# Patient Record
Sex: FEMALE | Race: BLACK OR AFRICAN AMERICAN | Employment: FULL TIME | URBAN - METROPOLITAN AREA
[De-identification: names, ages, dates, MRNs, and addresses within clinical notes are randomized per-mention and may not be internally consistent; named-entity substitution may affect disease eponyms.]

---

## 2017-01-24 ENCOUNTER — OFFICE VISIT (OUTPATIENT)
Dept: FAMILY MEDICINE CLINIC | Age: 22
End: 2017-01-24

## 2017-01-24 VITALS
OXYGEN SATURATION: 92 % | BODY MASS INDEX: 26.36 KG/M2 | HEART RATE: 68 BPM | RESPIRATION RATE: 16 BRPM | HEIGHT: 63 IN | TEMPERATURE: 98.2 F | DIASTOLIC BLOOD PRESSURE: 60 MMHG | WEIGHT: 148.8 LBS | SYSTOLIC BLOOD PRESSURE: 112 MMHG

## 2017-01-24 DIAGNOSIS — G89.29 CHRONIC PAIN OF RIGHT KNEE: ICD-10-CM

## 2017-01-24 DIAGNOSIS — N97.9 INFERTILITY, FEMALE: ICD-10-CM

## 2017-01-24 DIAGNOSIS — M25.561 CHRONIC PAIN OF RIGHT KNEE: ICD-10-CM

## 2017-01-24 DIAGNOSIS — Z00.00 PHYSICAL EXAM: Primary | ICD-10-CM

## 2017-01-24 PROBLEM — E28.2 PCOS (POLYCYSTIC OVARIAN SYNDROME): Status: ACTIVE | Noted: 2017-01-24

## 2017-01-24 PROBLEM — J45.30 MILD PERSISTENT ASTHMA WITHOUT COMPLICATION: Status: ACTIVE | Noted: 2017-01-24

## 2017-01-24 RX ORDER — NAPROXEN 500 MG/1
500 TABLET ORAL 2 TIMES DAILY WITH MEALS
Qty: 60 TAB | Refills: 1 | Status: SHIPPED | OUTPATIENT
Start: 2017-01-24 | End: 2017-08-09 | Stop reason: ALTCHOICE

## 2017-01-24 RX ORDER — METFORMIN HYDROCHLORIDE 1000 MG/1
1000 TABLET ORAL 2 TIMES DAILY WITH MEALS
COMMUNITY
End: 2017-02-20 | Stop reason: SDUPTHER

## 2017-01-24 RX ORDER — ALBUTEROL SULFATE 90 UG/1
AEROSOL, METERED RESPIRATORY (INHALATION)
COMMUNITY
End: 2017-08-09

## 2017-01-24 RX ORDER — IPRATROPIUM BROMIDE AND ALBUTEROL SULFATE 2.5; .5 MG/3ML; MG/3ML
3 SOLUTION RESPIRATORY (INHALATION)
COMMUNITY
End: 2017-08-09

## 2017-01-24 RX ORDER — BUDESONIDE AND FORMOTEROL FUMARATE DIHYDRATE 160; 4.5 UG/1; UG/1
AEROSOL RESPIRATORY (INHALATION)
Refills: 2 | COMMUNITY
Start: 2016-10-20 | End: 2019-06-28 | Stop reason: SDUPTHER

## 2017-01-24 NOTE — PATIENT INSTRUCTIONS

## 2017-01-24 NOTE — PROGRESS NOTES
Joe Mena is a 19 y.o. female   Chief Complaint   Patient presents with   [facility]    pt here for CPE. Pt with asthma and sees a pulmonologist for this. Pt reports she has been on steroids frequently in past and concerned of side effects, was previously not well controlled and feels much better controlled on symbicort. Pt is actively trying to become pregnant has been trying on and off for 2 years. Pt has also tried 3 rounds of clomid. Has pap scheduled for today. Pt states she has knee sx plica resection 4794 and has occasional pain, uses naprosyn once in a while and would like refill of med. Chief Complaint   Patient presents with   [email or web address]     she is a 19y.o. year old female who presents for evalution. Reviewed PmHx, RxHx, FmHx, SocHx, AllgHx and updated and dated in the chart. Review of Systems - negative except as listed above in the HPI    Objective:     Vitals:    01/24/17 0824   BP: 112/60   Pulse: 68   Resp: 16   Temp: 98.2 °F (36.8 °C)   TempSrc: Oral   SpO2: 92%   Weight: 148 lb 12.8 oz (67.5 kg)   Height: 5' 3.39\" (1.61 m)       Current Outpatient Prescriptions   Medication Sig    SYMBICORT 160-4.5 mcg/actuation HFA inhaler USE 2 PUFF INHALATION BY MOUTH TWICE DAILY FOR 30 DAY(S)    albuterol (VENTOLIN HFA) 90 mcg/actuation inhaler Take  by inhalation.  albuterol-ipratropium (DUO-NEB) 2.5 mg-0.5 mg/3 ml nebu 3 mL by Nebulization route.  metFORMIN (GLUCOPHAGE) 1,000 mg tablet Take 1,000 mg by mouth two (2) times daily (with meals).  naproxen (NAPROSYN) 500 mg tablet Take 1 Tab by mouth two (2) times daily (with meals). As needed     No current facility-administered medications for this visit.         Physical Examination: General appearance - alert, well appearing, and in no distress  Eyes - pupils equal and reactive, extraocular eye movements intact  Ears - bilateral TM's and external ear canals normal  Nose - normal and patent, no erythema, discharge or polyps  Mouth - mucous membranes moist, pharynx normal without lesions  Neck - supple, no significant adenopathy  Chest - clear to auscultation, no wheezes, rales or rhonchi, symmetric air entry  Heart - normal rate, regular rhythm, normal S1, S2, no murmurs, rubs, clicks or gallops  Abdomen - soft, nontender, nondistended, no masses or organomegaly  Back exam - full range of motion, no tenderness, palpable spasm or pain on motion  Neurological - alert, oriented, normal speech, no focal findings or movement disorder noted  Musculoskeletal - no joint tenderness, deformity or swelling  Extremities - peripheral pulses normal, no pedal edema, no clubbing or cyanosis  Skin - normal coloration and turgor, no rashes, no suspicious skin lesions noted      Assessment/ Plan:   Barbara Herrera was seen today for establish care. Diagnoses and all orders for this visit:    Physical exam  -     LIPID PANEL  -     METABOLIC PANEL, COMPREHENSIVE  -     CBC WITH AUTOMATED DIFF  -     TSH 3RD GENERATION    Chronic pain of right knee  -     naproxen (NAPROSYN) 500 mg tablet; Take 1 Tab by mouth two (2) times daily (with meals). As needed    Infertility, female  -     REFERRAL TO ENDOCRINOLOGY     [facility]  Follow-up Disposition:  Return in about 1 year (around 1/24/2018), or if symptoms worsen or fail to improve. I have discussed the diagnosis with the patient and the intended plan as seen in the above orders. The patient has received an after-visit summary and questions were answered concerning future plans. Pt conveyed understanding of plan.     Medication Side Effects and Warnings were discussed with patient      Clearence Plana, DO

## 2017-01-24 NOTE — MR AVS SNAPSHOT
Visit Information Date & Time Provider Department Dept. Phone Encounter #  
 1/24/2017  8:00 AM Lindsay GamezGuillermo [phone] [id number] Follow-up Instructions Return if symptoms worsen or fail to improve. Upcoming Health Maintenance Date Due  
 HPV AGE 9Y-34Y (1 of 3 - Female 3 Dose Series) 6/12/2004 DTaP/Tdap/Td series (1 - Tdap) 6/12/2014 PAP AKA CERVICAL CYTOLOGY 6/12/2014 INFLUENZA AGE 9 TO ADULT 8/1/2016 Allergies as of 1/24/2017  Review Complete On: 1/24/2017 By: Lindsay Gamez DO Severity Noted Reaction Type Reactions Latex  01/24/2017   Side Effect Hives Pineapple  01/24/2017   Side Effect Hives Current Immunizations  Never Reviewed Name Date Influenza Vaccine 10/15/2016 Pneumococcal Conjugate (PCV-13) 7/15/2015 Not reviewed this visit You Were Diagnosed With   
  
 Codes Comments Physical exam    -  Primary ICD-10-CM: Z00.00 ICD-9-CM: V70.9 Chronic pain of right knee     ICD-10-CM: M25.561, G89.29 ICD-9-CM: 719.46, 338.29 Vitals BP Pulse Temp Resp Height(growth percentile) Weight(growth percentile) 112/60 (BP 1 Location: Left arm, BP Patient Position: Sitting) 68 98.2 °F (36.8 °C) (Oral) 16 5' 3.39\" (1.61 m) 148 lb 12.8 oz (67.5 kg) LMP SpO2 BMI OB Status Smoking Status (LMP Unknown) 92% 26.04 kg/m2 Polycystic Ovarian Syndrome Never Smoker Vitals History BMI and BSA Data Body Mass Index Body Surface Area 26.04 kg/m 2 1.74 m 2 Preferred Pharmacy Pharmacy Name Phone [id number] [address] [phone] Your Updated Medication List  
  
   
This list is accurate as of: 1/24/17  8:51 AM.  Always use your most recent med list.  
  
  
  
  
 albuterol-ipratropium 2.5 mg-0.5 mg/3 ml Nebu Commonly known as:  DUO-NEB  
3 mL by Nebulization route. metFORMIN 1,000 mg tablet Commonly known as:  GLUCOPHAGE Take 1,000 mg by mouth two (2) times daily (with meals). naproxen 500 mg tablet Commonly known as:  NAPROSYN Take 1 Tab by mouth two (2) times daily (with meals). As needed SYMBICORT 160-4.5 mcg/actuation HFA inhaler Generic drug:  budesonide-formoterol USE 2 PUFF INHALATION BY MOUTH TWICE DAILY FOR 30 DAY(S) VENTOLIN HFA 90 mcg/actuation inhaler Generic drug:  albuterol Take  by inhalation. Prescriptions Sent to Pharmacy Refills  
 naproxen (NAPROSYN) 500 mg tablet 1 Sig: Take 1 Tab by mouth two (2) times daily (with meals). As needed Class: Normal  
 Pharmacy: [id number], [address] [place] [address] #: [phone] Route: Oral  
  
We Performed the Following CBC WITH AUTOMATED DIFF [59550 CPT(R)] LIPID PANEL [74684 CPT(R)] METABOLIC PANEL, COMPREHENSIVE [37501 CPT(R)] TSH 3RD GENERATION [05894 CPT(R)] Follow-up Instructions Return if symptoms worsen or fail to improve. Patient Instructions A Healthy Lifestyle: Care Instructions Your Care Instructions A healthy lifestyle can help you feel good, stay at a healthy weight, and have plenty of energy for both work and play. A healthy lifestyle is something you can share with your whole family. A healthy lifestyle also can lower your risk for serious health problems, such as high blood pressure, heart disease, and diabetes. You can follow a few steps listed below to improve your health and the health of your family. Follow-up care is a key part of your treatment and safety. Be sure to make and go to all appointments, and call your doctor if you are having problems. Its also a good idea to know your test results and keep a list of the medicines you take. How can you care for yourself at home? · Do not eat too much sugar, fat, or fast foods.  You can still have dessert and treats now and then. The goal is moderation. · Start small to improve your eating habits. Pay attention to portion sizes, drink less juice and soda pop, and eat more fruits and vegetables. ¨ Eat a healthy amount of food. A 3-ounce serving of meat, for example, is about the size of a deck of cards. Fill the rest of your plate with vegetables and whole grains. ¨ Limit the amount of soda and sports drinks you have every day. Drink more water when you are thirsty. ¨ Eat at least 5 servings of fruits and vegetables every day. It may seem like a lot, but it is not hard to reach this goal. A serving or helping is 1 piece of fruit, 1 cup of vegetables, or 2 cups of leafy, raw vegetables. Have an apple or some carrot sticks as an afternoon snack instead of a candy bar. Try to have fruits and/or vegetables at every meal. 
· Make exercise part of your daily routine. You may want to start with simple activities, such as walking, bicycling, or slow swimming. Try to be active 30 to 60 minutes every day. You do not need to do all 30 to 60 minutes all at once. For example, you can exercise 3 times a day for 10 or 20 minutes. Moderate exercise is safe for most people, but it is always a good idea to talk to your doctor before starting an exercise program. 
· Keep moving. Josafat Sang the lawn, work in the garden, or [employer]. Take the stairs instead of the elevator at work. · If you smoke, quit. People who smoke have an increased risk for heart attack, stroke, cancer, and other lung illnesses. Quitting is hard, but there are ways to boost your chance of quitting tobacco for good. ¨ Use nicotine gum, patches, or lozenges. ¨ Ask your doctor about stop-smoking programs and medicines. ¨ Keep trying.  
In addition to reducing your risk of diseases in the future, you will notice some benefits soon after you stop using tobacco. If you have shortness of breath or asthma symptoms, they will likely get better within a few weeks after you quit. · Limit how much alcohol you drink. Moderate amounts of alcohol (up to 2 drinks a day for men, 1 drink a day for women) are okay. But drinking too much can lead to liver problems, high blood pressure, and other health problems. Family health If you have a family, there are many things you can do together to improve your health. · Eat meals together as a family as often as possible. · Eat healthy foods. This includes fruits, vegetables, lean meats and dairy, and whole grains. · Include your family in your fitness plan. Most people think of activities such as jogging or tennis as the way to fitness, but there are many ways you and your family can be more active. Anything that makes you breathe hard and gets your heart pumping is exercise. Here are some tips: 
¨ Walk to do errands or to take your child to school or the bus. ¨ Go for a family bike ride after dinner instead of watching TV. Where can you learn more? Go to http://tracey-quang.info/. Enter A873 in the search box to learn more about \"A Healthy Lifestyle: Care Instructions. \" Current as of: July 26, 2016 Content Version: 11.1 © 2047-1993 [employer]. Care instructions adapted under license by [employer] (which disclaims liability or warranty for this information). If you have questions about a medical condition or this instruction, always ask your healthcare professional. [place] [address] any warranty or liability for your use of this information. Introducing hospitals & HEALTH SERVICES! Mirta Angela introduces [employer] patient portal. Now you can access parts of your medical record, email your doctor's office, and request medication refills online. 1. In your internet browser, go to [email or web address] 2. Click on the First Time User? Click Here link in the Sign In box. You will see the New Member Sign Up page. 3. Enter your Refresh Body Access Code exactly as it appears below. You will not need to use this code after youve completed the sign-up process. If you do not sign up before the expiration date, you must request a new code. · Refresh Body Access Code: [email or web address] Expires: 4/24/2017  8:13 AM 
 
4. Enter the last four digits of your Social Security Number (xxxx) and Date of Birth (mm/dd/yyyy) as indicated and click Submit. You will be taken to the next sign-up page. 5. Create a Refresh Body ID. This will be your Refresh Body login ID and cannot be changed, so think of one that is secure and easy to remember. 6. Create a Refresh Body password. You can change your password at any time. 7. Enter your Password Reset Question and Answer. This can be used at a later time if you forget your password. 8. Enter your e-mail address. You will receive e-mail notification when new information is available in [address]. 9. Click Sign Up. You can now view and download portions of your medical record. 10. Click the Download Summary menu link to download a portable copy of your medical information. If you have questions, please visit the Frequently Asked Questions section of the Refresh Body website. Remember, Refresh Body is NOT to be used for urgent needs. For medical emergencies, dial 911. Now available from your iPhone and Android! Please provide this summary of care documentation to your next provider. Your primary care clinician is listed as Socrates Sesay. If you have any questions after today's visit, please call [phone].

## 2017-01-24 NOTE — PROGRESS NOTES
Pt here to est care  Pt new to area, unsure what pharm would like to use, would like any rx's on paper

## 2017-01-25 LAB
ALBUMIN SERPL-MCNC: 4.5 G/DL (ref 3.5–5.5)
ALBUMIN/GLOB SERPL: 1.5 {RATIO} (ref 1.1–2.5)
ALP SERPL-CCNC: 75 IU/L (ref 39–117)
ALT SERPL-CCNC: 9 IU/L (ref 0–32)
AST SERPL-CCNC: 13 IU/L (ref 0–40)
BASOPHILS # BLD AUTO: 0 X10E3/UL (ref 0–0.2)
BASOPHILS NFR BLD AUTO: 0 %
BILIRUB SERPL-MCNC: 0.3 MG/DL (ref 0–1.2)
BUN SERPL-MCNC: 9 MG/DL (ref 6–20)
BUN/CREAT SERPL: 10 (ref 8–20)
CALCIUM SERPL-MCNC: 9.7 MG/DL (ref 8.7–10.2)
CHLORIDE SERPL-SCNC: 100 MMOL/L (ref 96–106)
CHOLEST SERPL-MCNC: 186 MG/DL (ref 100–199)
CO2 SERPL-SCNC: 27 MMOL/L (ref 18–29)
CREAT SERPL-MCNC: 0.86 MG/DL (ref 0.57–1)
EOSINOPHIL # BLD AUTO: 0 X10E3/UL (ref 0–0.4)
EOSINOPHIL NFR BLD AUTO: 0 %
ERYTHROCYTE [DISTWIDTH] IN BLOOD BY AUTOMATED COUNT: 12.7 % (ref 12.3–15.4)
GLOBULIN SER CALC-MCNC: 3 G/DL (ref 1.5–4.5)
GLUCOSE SERPL-MCNC: 82 MG/DL (ref 65–99)
HCT VFR BLD AUTO: 35.5 % (ref 34–46.6)
HDLC SERPL-MCNC: 42 MG/DL
HGB BLD-MCNC: 11.6 G/DL (ref 11.1–15.9)
IMM GRANULOCYTES # BLD: 0 X10E3/UL (ref 0–0.1)
IMM GRANULOCYTES NFR BLD: 0 %
INTERPRETATION, 910389: NORMAL
LDLC SERPL CALC-MCNC: 123 MG/DL (ref 0–99)
LYMPHOCYTES # BLD AUTO: 3 X10E3/UL (ref 0.7–3.1)
LYMPHOCYTES NFR BLD AUTO: 24 %
MCH RBC QN AUTO: 27.9 PG (ref 26.6–33)
MCHC RBC AUTO-ENTMCNC: 32.7 G/DL (ref 31.5–35.7)
MCV RBC AUTO: 85 FL (ref 79–97)
MONOCYTES # BLD AUTO: 1 X10E3/UL (ref 0.1–0.9)
MONOCYTES NFR BLD AUTO: 8 %
NEUTROPHILS # BLD AUTO: 8.3 X10E3/UL (ref 1.4–7)
NEUTROPHILS NFR BLD AUTO: 68 %
PLATELET # BLD AUTO: 437 X10E3/UL (ref 150–379)
POTASSIUM SERPL-SCNC: 4.6 MMOL/L (ref 3.5–5.2)
PROT SERPL-MCNC: 7.5 G/DL (ref 6–8.5)
RBC # BLD AUTO: 4.16 X10E6/UL (ref 3.77–5.28)
SODIUM SERPL-SCNC: 142 MMOL/L (ref 134–144)
TRIGL SERPL-MCNC: 104 MG/DL (ref 0–149)
TSH SERPL DL<=0.005 MIU/L-ACNC: 1.05 UIU/ML (ref 0.45–4.5)
VLDLC SERPL CALC-MCNC: 21 MG/DL (ref 5–40)
WBC # BLD AUTO: 12.3 X10E3/UL (ref 3.4–10.8)

## 2017-02-20 DIAGNOSIS — E28.2 PCOS (POLYCYSTIC OVARIAN SYNDROME): Primary | ICD-10-CM

## 2017-02-20 RX ORDER — METFORMIN HYDROCHLORIDE 500 MG/1
500 TABLET ORAL 2 TIMES DAILY WITH MEALS
Qty: 60 TAB | Refills: 11 | Status: SHIPPED | OUTPATIENT
Start: 2017-02-20 | End: 2017-08-09 | Stop reason: ALTCHOICE

## 2017-03-21 ENCOUNTER — OFFICE VISIT (OUTPATIENT)
Dept: FAMILY MEDICINE CLINIC | Age: 22
End: 2017-03-21

## 2017-03-21 VITALS
OXYGEN SATURATION: 99 % | DIASTOLIC BLOOD PRESSURE: 82 MMHG | HEIGHT: 63 IN | WEIGHT: 168.8 LBS | RESPIRATION RATE: 18 BRPM | SYSTOLIC BLOOD PRESSURE: 112 MMHG | HEART RATE: 76 BPM | BODY MASS INDEX: 29.91 KG/M2 | TEMPERATURE: 97.8 F

## 2017-03-21 DIAGNOSIS — D72.829 LEUKOCYTOSIS, UNSPECIFIED TYPE: Primary | ICD-10-CM

## 2017-03-21 NOTE — PATIENT INSTRUCTIONS
Complete Blood Count (CBC): About This Test  What is it? A complete blood count (CBC) is a blood test that gives important information about your blood cells, especially red blood cells, white blood cells, and platelets. Why is this test done? A CBC may be done as part of a regular physical exam. There are many other reasons that a doctor may want this blood test, including to:  · Find the cause of symptoms such as fatigue, weakness, fever, bruising, or weight loss. · Find anemia or an infection. · See how much blood has been lost if there is bleeding. · Diagnose diseases of the blood, such as leukemia or polycythemia. How can you prepare for the test?  You do not need to do anything before having this test.  What happens during the test?  The health professional taking a sample of your blood will:  · Wrap an elastic band around your upper arm. This makes the veins below the band larger so it is easier to put a needle into the vein. · Clean the needle site with alcohol. · Put the needle into the vein. · Attach a tube to the needle to fill it with blood. · Remove the band from your arm when enough blood is collected. · Put a gauze pad or cotton ball over the needle site as the needle is removed. · Put pressure on the site and then put on a bandage. If this blood test is done on a baby, a heel stick may be done instead of a blood draw from a vein. What happens after the test?  · You will probably be able to go home right away. · You can go back to your usual activities right away. Follow-up care is a key part of your treatment and safety. Be sure to make and go to all appointments, and call your doctor if you are having problems. It's also a good idea to keep a list of the medicines you take. Ask your doctor when you can expect to have your test results. Where can you learn more? Go to http://tracey-quang.info/.   Enter U239 in the search box to learn more about \"Complete Blood Count (CBC): About This Test.\"  Current as of: February 19, 2016  Content Version: 11.1  © 2174-5949 [employer]. Care instructions adapted under license by [employer] (which disclaims liability or warranty for this information). If you have questions about a medical condition or this instruction, always ask your healthcare professional. [place] [address] any warranty or liability for your use of this information.

## 2017-03-21 NOTE — PROGRESS NOTES
Shannon Herr is a 19 y.o. female   Chief Complaint   Patient presents with    Labs    pt here for recheck of her WBC and states that her mother has a blood dyscrasia but is not sure what but will ask her mother and send me the name of her issue. Pt was not feeling sick when labs were last drawn but states she is frequently stressed out. she is a 19y.o. year old female who presents for evalution. Reviewed PmHx, RxHx, FmHx, SocHx, AllgHx and updated and dated in the chart. Review of Systems - negative except as listed above in the HPI    Objective:     Vitals:    03/21/17 0837   BP: 112/82   Pulse: 76   Resp: 18   Temp: 97.8 °F (36.6 °C)   TempSrc: Oral   SpO2: 99%   Weight: 168 lb 12.8 oz (76.6 kg)   Height: 5' 3.39\" (1.61 m)       Current Outpatient Prescriptions   Medication Sig    metFORMIN (GLUCOPHAGE) 500 mg tablet Take 1 Tab by mouth two (2) times daily (with meals).  SYMBICORT 160-4.5 mcg/actuation HFA inhaler USE 2 PUFF INHALATION BY MOUTH TWICE DAILY FOR 30 DAY(S)    albuterol (VENTOLIN HFA) 90 mcg/actuation inhaler Take  by inhalation.  albuterol-ipratropium (DUO-NEB) 2.5 mg-0.5 mg/3 ml nebu 3 mL by Nebulization route.  naproxen (NAPROSYN) 500 mg tablet Take 1 Tab by mouth two (2) times daily (with meals). As needed     No current facility-administered medications for this visit. Physical Examination: General appearance - alert, well appearing, and in no distress  Eyes - pupils equal and reactive, extraocular eye movements intact  Ears - bilateral TM's and external ear canals normal  Mouth - mucous membranes moist, pharynx normal without lesions  Neck - supple, no significant adenopathy  Chest - clear to auscultation, no wheezes, rales or rhonchi, symmetric air entry  Heart - normal rate, regular rhythm, normal S1, S2, no murmurs, rubs, clicks or gallops      Assessment/ Plan:   Olamide Mendez was seen today for labs.     Diagnoses and all orders for this visit:    Leukocytosis, unspecified type  -     CBC WITH AUTOMATED DIFF       Follow-up Disposition:  Return if symptoms worsen or fail to improve. I have discussed the diagnosis with the patient and the intended plan as seen in the above orders. The patient has received an after-visit summary and questions were answered concerning future plans. Pt conveyed understanding of plan.     Medication Side Effects and Warnings were discussed with patient      Yareli Mcgarry, DO

## 2017-03-22 LAB
BASOPHILS # BLD AUTO: 0 X10E3/UL (ref 0–0.2)
BASOPHILS NFR BLD AUTO: 0 %
EOSINOPHIL # BLD AUTO: 0.1 X10E3/UL (ref 0–0.4)
EOSINOPHIL NFR BLD AUTO: 1 %
ERYTHROCYTE [DISTWIDTH] IN BLOOD BY AUTOMATED COUNT: 13.1 % (ref 12.3–15.4)
HCT VFR BLD AUTO: 36.3 % (ref 34–46.6)
HGB BLD-MCNC: 11.7 G/DL (ref 11.1–15.9)
IMM GRANULOCYTES # BLD: 0 X10E3/UL (ref 0–0.1)
IMM GRANULOCYTES NFR BLD: 0 %
LYMPHOCYTES # BLD AUTO: 3.3 X10E3/UL (ref 0.7–3.1)
LYMPHOCYTES NFR BLD AUTO: 27 %
MCH RBC QN AUTO: 28.2 PG (ref 26.6–33)
MCHC RBC AUTO-ENTMCNC: 32.2 G/DL (ref 31.5–35.7)
MCV RBC AUTO: 88 FL (ref 79–97)
MONOCYTES # BLD AUTO: 0.8 X10E3/UL (ref 0.1–0.9)
MONOCYTES NFR BLD AUTO: 7 %
NEUTROPHILS # BLD AUTO: 8.1 X10E3/UL (ref 1.4–7)
NEUTROPHILS NFR BLD AUTO: 65 %
PLATELET # BLD AUTO: 405 X10E3/UL (ref 150–379)
RBC # BLD AUTO: 4.15 X10E6/UL (ref 3.77–5.28)
WBC # BLD AUTO: 12.3 X10E3/UL (ref 3.4–10.8)

## 2017-07-19 ENCOUNTER — OFFICE VISIT (OUTPATIENT)
Dept: FAMILY MEDICINE CLINIC | Age: 22
End: 2017-07-19

## 2017-07-19 VITALS
SYSTOLIC BLOOD PRESSURE: 132 MMHG | OXYGEN SATURATION: 100 % | DIASTOLIC BLOOD PRESSURE: 78 MMHG | BODY MASS INDEX: 30.83 KG/M2 | WEIGHT: 174 LBS | RESPIRATION RATE: 18 BRPM | TEMPERATURE: 98.2 F | HEIGHT: 63 IN | HEART RATE: 88 BPM

## 2017-07-19 DIAGNOSIS — D72.829 LEUKOCYTOSIS, UNSPECIFIED TYPE: Primary | ICD-10-CM

## 2017-07-19 DIAGNOSIS — Z23 ENCOUNTER FOR IMMUNIZATION: ICD-10-CM

## 2017-07-19 DIAGNOSIS — Z11.1 SCREENING-PULMONARY TB: ICD-10-CM

## 2017-07-19 DIAGNOSIS — Z01.84 IMMUNITY STATUS TESTING: ICD-10-CM

## 2017-07-19 NOTE — PATIENT INSTRUCTIONS
Td (Tetanus, Diphtheria) Vaccine: What You Need to Know  Why get vaccinated? Tetanus and diphtheria are very serious diseases. They are rare in the [place] today, but people who do become infected often have severe complications. Td vaccine is used to protect adolescents and adults from both of these diseases. Both diphtheria and tetanus are infections caused by bacteria. Diphtheria spreads from person to person through secretions from coughing or sneezing. Tetanus-causing bacteria enter the body through cuts, scratches, or wounds. TETANUS (lockjaw) causes painful muscle tightening and stiffness, usually all over the body. · It can lead to tightening of muscles in the head and neck so you can't open your mouth, swallow, or sometimes even breathe. Tetanus kills about 1 out of every 10 people who are infected even after receiving the best medical care. DIPHTHERIA can cause a thick coating to form in the back of the throat. · It can lead to breathing problems, heart failure, paralysis, and death. Before vaccines, as many as 200,000 cases of diphtheria and hundreds of cases of tetanus were reported in the [place] each year. Since vaccination began, reports of cases for both diseases have dropped by about 99%. Td vaccine  Td vaccine can protect adolescents and adults from tetanus and diphtheria. Td is usually given as a booster dose every 10 years, but it can also be given earlier after a severe and dirty wound or burn. Another vaccine, called Tdap, which protects against pertussis in addition to tetanus and diphtheria, is sometimes recommended instead of Td vaccine. Your doctor or the person giving you the vaccine can give you more information. Td may safely be given at the same time as other vaccines.   Some people should not get this vaccine  · A person who has ever had a life-threatening allergic reaction after a previous dose of any tetanus- or diphtheria-containing vaccine, OR has a severe allergy to any part of this vaccine, should not get Td vaccine. Tell the person giving the vaccine about any severe allergies. · Talk to your doctor if you:  ¨ Have seizures or another nervous system problem. ¨ Had severe pain or swelling after any vaccine containing diphtheria or tetanus. ¨ Ever had a condition called Guillain Barré Syndrome (GBS). ¨ Aren't feeling well on the day the shot is scheduled. Risks of a vaccine reaction  With any medicine, including vaccines, there is a chance of side effects. These are usually mild and go away on their own. Serious reactions are also possible but are rare. Most people who get Td vaccine do not have any problems with it. Mild problems, following Td vaccine  (Did not interfere with activities)  · Pain where the shot was given (about 8 people in 10)  · Redness or swelling where the shot was given (about 1 person in 4)  · Mild fever (rare)  · Headache (about 1 person in 4)  · Tiredness (about 1 person in 4)  Moderate problems, following Td vaccine  (Interfered with activities, but did not require medical attention)  · Fever over 102°F (rare)  Severe problems, following Td vaccine  (Unable to perform usual activities; required medical attention)  · Swelling, severe pain, bleeding, and/or redness in the arm where the shot was given (rare)  Problems that could happen after any vaccine:  · People sometimes faint after a medical procedure, including vaccination. Sitting or lying down for about 15 minutes can help prevent fainting, and injuries caused by a fall. Tell your doctor if you feel dizzy or have vision changes or ringing in the ears. · Some people get severe pain in the shoulder and have difficulty moving the arm where a shot was given. This happens very rarely. · Any medication can cause a severe allergic reaction.  Such reactions from a vaccine are very rare, estimated at fewer than 1 in a million doses, and would happen within a few minutes to a few hours after the vaccination. As with any medicine, there is a very remote chance of a vaccine causing a serious injury or death. The safety of vaccines is always being monitored. For more information, visit: www.cdc.gov/vaccinesafety. What if there is a serious reaction? What should I look for? · Look for anything that concerns you, such as signs of a severe allergic reaction, very high fever, or unusual behavior. Signs of a severe allergic reaction can include hives, swelling of the face and throat, difficulty breathing, a fast heartbeat, dizziness, and weakness. These would usually start a few minutes to a few hours after the vaccination. What should I do? · If you think it is a severe allergic reaction or other emergency that can't wait, call 9-1-1 or get the person to the nearest hospital. Otherwise, call your doctor. · Afterward, the reaction should be reported to the Vaccine Adverse Event Reporting System (VAERS). Your doctor might file this report, or you can do it yourself through the VAERS web site at www.vaers. [facility].gov, or by calling 0-315.967.9428. VAERS does not give medical advice. The National Vaccine Injury Compensation Program  The National Vaccine Injury Compensation Program (VICP) is a federal program that was created to compensate people who may have been injured by certain vaccines. Persons who believe they may have been injured by a vaccine can learn about the program and about filing a claim by calling 5-848.120.3913 or visiting the [employer] website at www.[facility].gov/vaccinecompensation. There is a time limit to file a claim for compensation. How can I learn more? · Ask your doctor. He or she can give you the vaccine package insert or suggest other sources of information. · Call your local or state health department.   · Contact the Centers for Disease Control and Prevention (CDC):  ¨ Call 3-333.150.9536 (1-800-CDC-INFO) or  ¨ Visit CDC's website at www.cdc.gov/vaccines  Vaccine Information Statement (Interim)  Td Vaccine  (2/24/2015)  42 JOSE FRANCISCO Faye 258SW-06  Department of Health and Human Services  Centers for Disease Control and Prevention  Many Vaccine Information Statements are available in Citizen of [place] and other languages. See www.immunize.org/vis. Muchas hojas de información sobre vacunas están disponibles en español y en otros idiomas. Visite www.immunize.org/vis. Care instructions adapted under license by [employer] (which disclaims liability or warranty for this information). If you have questions about a medical condition or this instruction, always ask your healthcare professional. [address] any warranty or liability for your use of this information.

## 2017-07-19 NOTE — PROGRESS NOTES
Pineda Kennedy is a 23 y.o. 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 Needs two step but pt request blood test and this is acceptable. Pt will need a tdap and will check titers. Pt also with elevated WBC count last check and will recheck today. she is a 23y.o. year old female who presents for evalution. Reviewed PmHx, RxHx, FmHx, SocHx, AllgHx and updated and dated in the chart. Review of Systems - negative except as listed above in the HPI    Objective:     Vitals:    07/19/17 1557   BP: 132/78   Pulse: 88   Resp: 18   Temp: 98.2 °F (36.8 °C)   TempSrc: Oral   SpO2: 100%   Weight: 174 lb (78.9 kg)   Height: 5' 3.39\" (1.61 m)       Current Outpatient Prescriptions   Medication Sig    norel[facility]romin[facility]ethinyl est[facility]) 150-35 mcg/24 hr 1 Patch by TransDERmal route Every Saturday.  metFORMIN (GLUCOPHAGE) 500 mg tablet Take 1 Tab by mouth two (2) times daily (with meals).  SYMBICORT 160-4.5 mcg/actuation HFA inhaler USE 2 PUFF INHALATION BY MOUTH TWICE DAILY FOR 30 DAY(S)    albuterol (VENTOLIN HFA) 90 mcg/actuation inhaler Take  by inhalation.  albuterol-ipratropium (DUO-NEB) 2.5 mg-0.5 mg/3 ml nebu 3 mL by Nebulization route.  naproxen (NAPROSYN) 500 mg tablet Take 1 Tab by mouth two (2) times daily (with meals). As needed     No current facility-administered medications for this visit.         Physical Examination: General appearance - alert, well appearing, and in no distress  Mental status - alert, oriented to person, place, and time  Eyes - pupils equal and reactive, extraocular eye movements intact  Ears - bilateral TM's and external ear canals normal  Nose - normal and patent, no erythema, discharge or polyps  Mouth - mucous membranes moist, pharynx normal without lesions  Neck - supple, no significant adenopathy  Lymphatics - no palpable lymphadenopathy, no hepatosplenomegaly  Chest - clear to auscultation, no wheezes, rales or rhonchi, symmetric air entry  Heart - normal rate, regular rhythm, normal S1, S2, no murmurs, rubs, clicks or gallops  Abdomen - soft, nontender, nondistended, no masses or organomegaly  Neurological - alert, oriented, normal speech, no focal findings or movement disorder noted  Musculoskeletal - no joint tenderness, deformity or swelling  Extremities - peripheral pulses normal, no pedal edema, no clubbing or cyanosis  Skin - normal coloration and turgor, no rashes, no suspicious skin lesions noted      Assessment/ Plan:   Anca Hernandez was seen today for immunization/injection and labs. Diagnoses and all orders for this visit:    Leukocytosis, unspecified type  -     CBC WITH AUTOMATED DIFF    Immunity status testing  -     MEASLES/MUMPS/RUBELLA IMMUNITY  -     HEP B SURFACE AB  -     VZV AB, IGG    Screening-pulmonary TB  -     QUANTIFERON TB GOLD    Encounter for immunization  -     Tetanus, diphtheria toxoids and acellular pertussis (TDAP) vaccine, in individuals >=7 years, IM  -     IN IMMUNIZ ADMIN,1 SINGLE/COMB VAC/TOXOID       Follow-up Disposition:  Return if symptoms worsen or fail to improve. I have discussed the diagnosis with the patient and the intended plan as seen in the above orders. The patient has received an after-visit summary and questions were answered concerning future plans. Pt conveyed understanding of plan.     Medication Side Effects and Warnings were discussed with patient      Fady Chicas,

## 2017-07-24 LAB
ANNOTATION COMMENT IMP: NORMAL
BASOPHILS # BLD AUTO: 0 X10E3/UL (ref 0–0.2)
BASOPHILS NFR BLD AUTO: 0 %
EOSINOPHIL # BLD AUTO: 0.1 X10E3/UL (ref 0–0.4)
EOSINOPHIL NFR BLD AUTO: 1 %
ERYTHROCYTE [DISTWIDTH] IN BLOOD BY AUTOMATED COUNT: 13 % (ref 12.3–15.4)
GAMMA INTERFERON BACKGROUND BLD IA-ACNC: 0.04 IU/ML
HBV SURFACE AB SER QL: NON REACTIVE
HCT VFR BLD AUTO: 34.5 % (ref 34–46.6)
HGB BLD-MCNC: 11.3 G/DL (ref 11.1–15.9)
IMM GRANULOCYTES # BLD: 0 X10E3/UL (ref 0–0.1)
IMM GRANULOCYTES NFR BLD: 0 %
LYMPHOCYTES # BLD AUTO: 2.8 X10E3/UL (ref 0.7–3.1)
LYMPHOCYTES NFR BLD AUTO: 27 %
M TB IFN-G BLD-IMP: NEGATIVE
M TB IFN-G CD4+ BCKGRND COR BLD-ACNC: <0 IU/ML
M TB IFN-G CD4+ T-CELLS BLD-ACNC: 0.02 IU/ML
MCH RBC QN AUTO: 27 PG (ref 26.6–33)
MCHC RBC AUTO-ENTMCNC: 32.8 G/DL (ref 31.5–35.7)
MCV RBC AUTO: 83 FL (ref 79–97)
MEV IGG SER IA-ACNC: 30 AU/ML
MITOGEN IGNF BLD-ACNC: 8.19 IU/ML
MONOCYTES # BLD AUTO: 0.8 X10E3/UL (ref 0.1–0.9)
MONOCYTES NFR BLD AUTO: 8 %
MORPHOLOGY BLD-IMP: ABNORMAL
MUV IGG SER IA-ACNC: 111 AU/ML
NEUTROPHILS # BLD AUTO: 6.8 X10E3/UL (ref 1.4–7)
NEUTROPHILS NFR BLD AUTO: 64 %
PLATELET # BLD AUTO: 485 X10E3/UL (ref 150–379)
QUANTIFERON INCUBATION: NORMAL
RBC # BLD AUTO: 4.18 X10E6/UL (ref 3.77–5.28)
RUBV IGG SERPL IA-ACNC: 4.29 INDEX
SERVICE CMNT-IMP: NORMAL
VZV IGG SER IA-ACNC: 896 INDEX
WBC # BLD AUTO: 10.6 X10E3/UL (ref 3.4–10.8)

## 2017-07-25 NOTE — PROGRESS NOTES
Your Tb test came back negative and you are immune to MMR and varicella however, you are NOT immune to Hep B. Please make an appt to get a booster. Let them know it is vaccine only. Your platelets keep rising, this can cause issues with the thickness of your blood.   I would like for you to see a hematologist Dr Eliana Hickey at [place] [id number] [facility] [id number], [place], [address] [facility]  Phone: [phone]

## 2017-07-28 ENCOUNTER — CLINICAL SUPPORT (OUTPATIENT)
Dept: FAMILY MEDICINE CLINIC | Age: 22
End: 2017-07-28

## 2017-07-28 DIAGNOSIS — Z23 ENCOUNTER FOR IMMUNIZATION: Primary | ICD-10-CM

## 2017-07-28 NOTE — PATIENT INSTRUCTIONS
Hepatitis A/Hepatitis B Vaccine (By injection)   Hepatitis A Vaccine, Inactivated (hep-a-SIXTO-tis A VAX-een, in-AK-ti-vay-melquiades), Hepatitis B Vaccine Recombinant (hep-a-SIXTO-tis B VAX-een re-KOM-bin-ant)  Prevents infection caused by hepatitis A and hepatitis B viruses. Brand Name(s): Twinrix   There may be other brand names for this medicine. When This Medicine Should Not Be Used: This vaccine is not right for everyone. You should not receive it if you had an allergic reaction to hepatitis A or hepatitis B vaccine or to neomycin or yeast.  How to Use This Medicine:   Injectable  · Your doctor will prescribe your exact dose and tell you how often it should be given. This medicine is given as a shot into one of your muscles. · A nurse or other health provider will give you this medicine. · This vaccine is usually given as 3 doses. The second dose is given 1 month after the first dose. The third dose is given 6 months after the first dose. However, your doctor may suggest a different schedule. · Missed dose: It is important that you receive each dose at the right time. If you miss your scheduled shot, call your doctor to make another appointment as soon as possible. Drugs and Foods to Avoid:   Ask your doctor or pharmacist before using any other medicine, including over-the-counter medicines, vitamins, and herbal products. · Some foods and medicines can affect how this vaccine works. Tell your doctor if you have recently received any treatment that weakens the immune system, such as cancer medicine, radiation treatment, or a steroid. Warnings While Using This Medicine:   · Tell your doctor if you are pregnant or breastfeeding, or if you are allergic to latex rubber. Also, tell your doctor if you have a weak immune system, or if you have been sick or had a fever recently.   Possible Side Effects While Using This Medicine:   Call your doctor right away if you notice any of these side effects:  · Allergic reaction: Itching or hives, swelling in your face or hands, swelling or tingling in your mouth or throat, chest tightness, trouble breathing  · Blistering, peeling, or red skin rash  · Fainting, trouble seeing, numbness  If you notice these less serious side effects, talk with your doctor:   · Headache  · Pain, redness, or swelling where the shot is given  · Tiredness  If you notice other side effects that you think are caused by this medicine, tell your doctor. Call your doctor for medical advice about side effects. You may report side effects to FDA at 7-721-FDA-5594  © 2017 [address] [employer] Information is for End User's use only and may not be sold, redistributed or otherwise used for commercial purposes. The above information is an  only. It is not intended as medical advice for individual conditions or treatments. Talk to your doctor, nurse or pharmacist before following any medical regimen to see if it is safe and effective for you.

## 2017-08-09 ENCOUNTER — OFFICE VISIT (OUTPATIENT)
Dept: ONCOLOGY | Age: 22
End: 2017-08-09

## 2017-08-09 ENCOUNTER — HOSPITAL ENCOUNTER (OUTPATIENT)
Dept: INFUSION THERAPY | Age: 22
Discharge: HOME | End: 2017-08-09
Payer: COMMERCIAL

## 2017-08-09 VITALS
DIASTOLIC BLOOD PRESSURE: 75 MMHG | BODY MASS INDEX: 30.51 KG/M2 | OXYGEN SATURATION: 100 % | TEMPERATURE: 96.7 F | RESPIRATION RATE: 16 BRPM | SYSTOLIC BLOOD PRESSURE: 111 MMHG | HEIGHT: 63 IN | WEIGHT: 172.2 LBS | HEART RATE: 88 BPM

## 2017-08-09 VITALS
TEMPERATURE: 98.2 F | HEART RATE: 84 BPM | DIASTOLIC BLOOD PRESSURE: 73 MMHG | SYSTOLIC BLOOD PRESSURE: 114 MMHG | RESPIRATION RATE: 16 BRPM

## 2017-08-09 DIAGNOSIS — D75.839 THROMBOCYTOSIS: Primary | ICD-10-CM

## 2017-08-09 DIAGNOSIS — D72.9 NEUTROPHILIA: ICD-10-CM

## 2017-08-09 LAB
BASOPHILS # BLD AUTO: 0 K/UL (ref 0–0.1)
BASOPHILS # BLD: 0 % (ref 0–1)
CRP SERPL-MCNC: 1.91 MG/DL
EOSINOPHIL # BLD: 0 K/UL (ref 0–0.4)
EOSINOPHIL NFR BLD: 0 % (ref 0–7)
ERYTHROCYTE [DISTWIDTH] IN BLOOD BY AUTOMATED COUNT: 12.2 % (ref 11.5–14.5)
ERYTHROCYTE [SEDIMENTATION RATE] IN BLOOD: 45 MM/HR (ref 0–20)
FERRITIN SERPL-MCNC: 80 NG/ML (ref 8–252)
HCT VFR BLD AUTO: 36.2 % (ref 35–47)
HGB BLD-MCNC: 11.8 G/DL (ref 11.5–16)
IRON SATN MFR SERPL: 24 % (ref 20–50)
IRON SERPL-MCNC: 87 UG/DL (ref 35–150)
LDH SERPL L TO P-CCNC: 120 U/L (ref 81–246)
LYMPHOCYTES # BLD AUTO: 20 % (ref 12–49)
LYMPHOCYTES # BLD: 2 K/UL (ref 0.8–3.5)
MCH RBC QN AUTO: 27.2 PG (ref 26–34)
MCHC RBC AUTO-ENTMCNC: 32.6 G/DL (ref 30–36.5)
MCV RBC AUTO: 83.4 FL (ref 80–99)
MONOCYTES # BLD: 0.7 K/UL (ref 0–1)
MONOCYTES NFR BLD AUTO: 7 % (ref 5–13)
NEUTS SEG # BLD: 7.5 K/UL (ref 1.8–8)
NEUTS SEG NFR BLD AUTO: 73 % (ref 32–75)
PERIPHERAL SMEAR,PSM: NORMAL
PLATELET # BLD AUTO: 414 K/UL (ref 150–400)
RBC # BLD AUTO: 4.34 M/UL (ref 3.8–5.2)
TIBC SERPL-MCNC: 363 UG/DL (ref 250–450)
WBC # BLD AUTO: 10.2 K/UL (ref 3.6–11)

## 2017-08-09 PROCEDURE — 83540 ASSAY OF IRON: CPT | Performed by: INTERNAL MEDICINE

## 2017-08-09 PROCEDURE — 36415 COLL VENOUS BLD VENIPUNCTURE: CPT | Performed by: INTERNAL MEDICINE

## 2017-08-09 PROCEDURE — 36415 COLL VENOUS BLD VENIPUNCTURE: CPT

## 2017-08-09 PROCEDURE — 85025 COMPLETE CBC W/AUTO DIFF WBC: CPT | Performed by: INTERNAL MEDICINE

## 2017-08-09 PROCEDURE — 85652 RBC SED RATE AUTOMATED: CPT | Performed by: INTERNAL MEDICINE

## 2017-08-09 PROCEDURE — 83615 LACTATE (LD) (LDH) ENZYME: CPT | Performed by: INTERNAL MEDICINE

## 2017-08-09 PROCEDURE — 82728 ASSAY OF FERRITIN: CPT | Performed by: INTERNAL MEDICINE

## 2017-08-09 PROCEDURE — 86140 C-REACTIVE PROTEIN: CPT | Performed by: INTERNAL MEDICINE

## 2017-08-09 RX ORDER — ALBUTEROL SULFATE 90 UG/1
POWDER, METERED RESPIRATORY (INHALATION)
Refills: 3 | COMMUNITY
Start: 2017-07-20 | End: 2017-08-09

## 2017-08-09 RX ORDER — ALBUTEROL SULFATE 90 UG/1
AEROSOL, METERED RESPIRATORY (INHALATION)
COMMUNITY
Start: 2017-07-12

## 2017-08-09 RX ORDER — LORATADINE 10 MG/1
10 TABLET ORAL
COMMUNITY

## 2017-08-09 RX ORDER — TRIAMCINOLONE ACETONIDE 55 UG/1
2 SPRAY, METERED NASAL DAILY
COMMUNITY
End: 2017-11-09 | Stop reason: ALTCHOICE

## 2017-08-09 NOTE — PROGRESS NOTES
[id number] [facility] Oncology at [address] [place] [address]  [phone]    Hematology / Oncology Consult    Reason for Visit:   Joe Crump is a 23 y.o. female who is seen in consultation at the request of Dr. Deborah Beasley for evaluation of thrombocytosis, leukocytosis. History of Present Illness:   Joe Crump is a pleasant 23 y.o. female who presents today for evaluation of thrombocytosis and leukocytosis. She reports seeing her PCP for routine evaluation earlier this year, and bloodwork demonstrated these abnormalities. They were confirmed on repeat. She reports feeling well. Some fatigue at times. Has PCOS with associated menorhagia, but this is better since starting OCPs 3 months ago. Has required oral iron in the past, not taking currently, has constipation with this in the past.  No bleeding. No fevers, chills, night sweats, weight loss, adenopathy. No recent or recurring infections. No recent surgeries or trauma. No history of splenectomy. She does report a history of asthma, but has not required steroids for this in over a year. She notes that her mother has a history of \"high white blood cells\" and has required therapeutic phlebotomy with Dr. Eusebio Dillard. 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     Past Medical History:   Diagnosis Date    Asthma       Past Surgical History:   Procedure Laterality Date    HX ORTHOPAEDIC      bilateral plica resection (23/16/4056)      Social History   Substance Use Topics    Smoking status: Never Smoker    Smokeless tobacco: Never Used    Alcohol use 0.6 oz/week     1 Glasses of wine per week      Family History   Problem Relation Age of Onset    Hypertension Mother     Hypertension Father     Heart Disease Father     Cancer Maternal Aunt      lung can    Cancer Maternal Uncle      lung cancer     Current Outpatient Prescriptions   Medication Sig    PROAIR HFA 90 mcg/actuation inhaler     mometasone-formoterol (DULERA) 200-5 mcg/actuation HFA inhaler Take 2 Puffs by inhalation two (2) times a day.  loratadine (CLARITIN) 10 mg tablet Take 10 mg by mouth.  triamcinolone (NASACORT AQ) 55 mcg nasal inhaler 2 Sprays daily.  B.infantis-B.ani-B.long-B.bifi (PROBIOTIC 4X) 10-15 mg TbEC Take  by mouth.  norelgestromin-ethinyl estradiol Evelyn Bosworth) 150-35 mcg/24 hr 1 Patch by TransDERmal route Every Saturday.  SYMBICORT 160-4.5 mcg/actuation HFA inhaler USE 2 PUFF INHALATION BY MOUTH TWICE DAILY FOR 30 DAY(S)     No current facility-administered medications for this visit. Allergies   Allergen Reactions    Latex Hives    Pineapple Hives        Review of Systems: A complete review of systems was obtained, negative except as described above. Physical Exam:     Visit Vitals    /75 (BP 1 Location: Left arm, BP Patient Position: Sitting)    Pulse 88    Temp 96.7 °F (35.9 °C) (Temporal)    Resp 16    Ht 5' 3\" (1.6 m)    Wt 172 lb 3.2 oz (78.1 kg)    SpO2 100%    BMI 30.5 kg/m2     General: No distress  Eyes: PERRLA, anicteric sclerae  HENT: Atraumatic, OP clear  Neck: Supple  Lymphatic: No cervical, supraclavicular, or inguinal adenopathy  Respiratory: CTAB, normal respiratory effort  CV: Normal rate, regular rhythm, no murmurs, no peripheral edema  GI: Soft, nontender, nondistended, no masses, no hepatomegaly, no splenomegaly  MS: Normal gait and station. Digits without clubbing or cyanosis. Skin: No rashes, ecchymoses, or petechiae. Normal temperature, turgor, and texture. Psych: Alert, oriented, appropriate affect, normal judgment/insight    Results:     Lab Results   Component Value Date/Time    WBC 10.6 07/19/2017 04:23 PM    HGB 11.3 07/19/2017 04:23 PM    HCT 34.5 07/19/2017 04:23 PM    PLATELET 179 35/64/5552 04:23 PM    MCV 83 07/19/2017 04:23 PM    ABS.  NEUTROPHILS 6.8 07/19/2017 04:23 PM     Lab Results   Component Value Date/Time    Sodium 142 01/24/2017 08:42 AM    Potassium 4.6 01/24/2017 08:42 AM    Chloride 100 01/24/2017 08:42 AM    CO2 27 01/24/2017 08:42 AM    Glucose 82 01/24/2017 08:42 AM    BUN 9 01/24/2017 08:42 AM    Creatinine 0.86 01/24/2017 08:42 AM    GFR est  01/24/2017 08:42 AM    GFR est non-AA 96 01/24/2017 08:42 AM    Calcium 9.7 01/24/2017 08:42 AM     Lab Results   Component Value Date/Time    Bilirubin, total 0.3 01/24/2017 08:42 AM    ALT (SGPT) 9 01/24/2017 08:42 AM    AST (SGOT) 13 01/24/2017 08:42 AM    Alk. phosphatase 75 01/24/2017 08:42 AM    Protein, total 7.5 01/24/2017 08:42 AM    Albumin 4.5 01/24/2017 08:42 AM       Records reviewed and summarized above. Assessment:   1) Thrombocytosis  Mild, with platelets ranging from 405 to 485 since January. Chronicity uncertain, no prior labs available. This may be due to iron deficiency, given her menorrhagia. HGB is at low end of normal range, which would be consistent. MPN is possible as well, and it sounds like she may have a family history of this. I will check some labs to evaluate. If iron deficiency is confirmed, I will start her on ferrous sulfate TID and repeat labs in a few months. 2) Neutrophilia  Noted on labs from January and March, but resolved on labs in July. Possibly this was reactive to some source of inflammation that has since resolved. Check smear and inflammatory labs. Monitor. Plan:     · Labs today, we will call with results    I appreciate the opportunity to participate in Ms. Shalini Olivares [address] [place] [address]'s [facility].     Signed By: Joe Salvador MD     August 9, 2017

## 2017-08-09 NOTE — MR AVS SNAPSHOT
Visit Information Date & Time Provider Department Dept. Phone Encounter #  
 8/9/2017  8:00 AM MD [facility] at [address] [id number] Follow-up Instructions Return in about 3 months (around 11/9/2017) for Raddin, thrombocytosis fu. Your Appointments 11/9/2017  8:30 AM  
ESTABLISHED PATIENT with MD [facility] at [address] [place] [address]) Appt Note: Raddin, thrombocytosis fu.  
 [address] [facility], [address] [facility] [id number]  
[phone]  
  
   
 [address] [facility], [address] [place] [address] Upcoming Health Maintenance Date Due  
 HPV AGE 9Y-34Y (1 of 3 - Female 3 Dose Series) 6/12/2004 PAP AKA CERVICAL CYTOLOGY 6/12/2014 INFLUENZA AGE 9 TO ADULT 8/1/2017 Pneumococcal 19-64 Highest Risk (2 of 3 - PPSV23) 9/6/2017 DTaP/Tdap/Td series (2 - Td) 7/19/2027 Allergies as of 8/9/2017  Review Complete On: 8/9/2017 By: Sary Villela LPN Severity Noted Reaction Type Reactions Latex  01/24/2017   Side Effect Hives Pineapple  01/24/2017   Side Effect Hives Current Immunizations  Reviewed on 7/28/2017 Name Date Hep B Vaccine (Adult) 7/28/2017 Influenza Vaccine 10/15/2016 Pneumococcal Conjugate (PCV-13) 7/12/2017, 7/15/2015 Tdap 7/19/2017 Not reviewed this visit Vitals BP Pulse Temp Resp Height(growth percentile) 111/75 (BP 1 Location: Left arm, BP Patient Position: Sitting) 88 96.7 °F (35.9 °C) (Temporal) 16 5' 3\" (1.6 m) Weight(growth percentile) SpO2 BMI OB Status Smoking Status 172 lb 3.2 oz (78.1 kg) 100% 30.5 kg/m2 Polycystic Ovarian Syndrome Never Smoker BMI and BSA Data Body Mass Index Body Surface Area 30.5 kg/m 2 1.86 m 2 Preferred Pharmacy Pharmacy Name Phone  [id number] [email or web address] [place] - [address] [facility] [place] [address] [phone] Your Updated Medication List  
  
   
This list is accurate as of: 8/9/17  8:31 AM.  Always use your most recent med list.  
  
  
  
  
 CLARITIN 10 mg tablet Generic drug:  loratadine Take 10 mg by mouth. DULERA 200-5 mcg/actuation HFA inhaler Generic drug:  mometasone-formoterol Take 2 Puffs by inhalation two (2) times a day. PROAIR HFA 90 mcg/actuation inhaler Generic drug:  albuterol PROBIOTIC 4X 10-15 mg Tbec Generic drug:  B.infantis-B.ani-B.long-B.bifi Take  by mouth. SYMBICORT 160-4.5 mcg/actuation HFA inhaler Generic drug:  budesonide-formoterol USE 2 PUFF INHALATION BY MOUTH TWICE DAILY FOR 30 DAY(S)  
  
 triamcinolone 55 mcg nasal inhaler Commonly known as:  NASACORT AQ  
2 Sprays daily. Ludin Nathaniel 150-35 mcg/24 hr  
Generic drug:  norelgestromin-ethinyl estradiol 1 Patch by TransDERmal route Every Saturday. Follow-up Instructions Return in about 3 months (around 11/9/2017) for Shabnam thrombocytosis fu. [facility]! Dear Delmar Ibarra: Thank you for requesting a [employer] account. Our records indicate that you already have an active [employer] account. You can access your account anytime at [email or web address] Did you know that you can access your hospital and ER discharge instructions at any time in [employer]? You can also review all of your test results from your hospital stay or ER visit. Additional Information If you have questions, please visit the Frequently Asked Questions section of the First[employer] website at [email or web address] Remember, First[employer] is NOT to be used for urgent needs. For medical emergencies, dial 911. Now available from your iPhone and Android! Please provide this summary of care documentation to your next provider. Your primary care clinician is listed as Amanda Vee.  If you have any questions after today's visit, please call [phone].

## 2017-08-10 ENCOUNTER — TELEPHONE (OUTPATIENT)
Dept: ONCOLOGY | Age: 22
End: 2017-08-10

## 2017-08-10 DIAGNOSIS — E61.1 IRON DEFICIENCY: ICD-10-CM

## 2017-08-10 DIAGNOSIS — D75.839 THROMBOCYTOSIS: Primary | ICD-10-CM

## 2017-08-10 RX ORDER — FERROUS SULFATE 325(65) MG
325 TABLET, DELAYED RELEASE (ENTERIC COATED) ORAL 2 TIMES DAILY
Qty: 60 TAB | Refills: 5 | Status: SHIPPED | OUTPATIENT
Start: 2017-08-10 | End: 2018-10-25

## 2017-08-10 NOTE — TELEPHONE ENCOUNTER
[facility]  Medical Oncology at [facility] reviewed. PLT have improved somewhat. Inflammatory markers are elevated, suggesting occult inflammation may be contributing. Iron levels are normal, but low-normal.  Will give a trial of oral iron and repeat some labs before her next appointment.

## 2017-08-10 NOTE — TELEPHONE ENCOUNTER
Pt called stating she was seen yesterday and her lab results are in her chart but she would like to know what they are and also her pharmacy called and stated the prescription Dr. Rosalinda Maxwell sent to her pharmacy for iron pills is not covered.  Call back number [phone]

## 2017-08-10 NOTE — TELEPHONE ENCOUNTER
[facility]  Medical Oncology at [address] [place] [address]    08/10/17- Reviewed labs results with patient. Informed her that Dr. Simon Vaughan would like to try her on some oral iron for a few months to see if that helps. Since patient's insurance doesn't cover the prescription, directed her to get OTC iron to take BID. Also, informed patient that Dr. Simon Vaughan would like to recheck labs prior to her follow up in November. She verbalized understanding, no further questions or concerns.    -[facility] slip mailed to patient.

## 2017-08-29 ENCOUNTER — CLINICAL SUPPORT (OUTPATIENT)
Dept: FAMILY MEDICINE CLINIC | Age: 22
End: 2017-08-29

## 2017-08-29 VITALS — HEIGHT: 63 IN

## 2017-08-29 DIAGNOSIS — Z23 ENCOUNTER FOR IMMUNIZATION: Primary | ICD-10-CM

## 2017-08-29 NOTE — PATIENT INSTRUCTIONS
Hepatitis B Vaccine (By injection)   Hepatitis B Vaccine (hep-a-SIXTO-tis B VAX-een)  Prevents infection caused by hepatitis B virus. Brand Name(s):   There may be other brand names for this medicine. When This Medicine Should Not Be Used: This vaccine may not be right for everyone. You should not receive it if you had an allergic reaction to hepatitis B vaccine, or if you are allergic to yeast.  How to Use This Medicine:   Injectable  · A nurse or other health provider will give you this medicine. · Your doctor will prescribe your exact dose and tell you how often it should be given. This medicine is given as a shot into one of your muscles. · This vaccine is usually given as 3 doses, but sometime 4 doses are needed. · Missed dose: It is important that you receive all doses at the right times. If you miss a scheduled shot, call your doctor to make another appointment as soon as possible. Drugs and Foods to Avoid:   Ask your doctor or pharmacist before using any other medicine, including over-the-counter medicines, vitamins, and herbal products. · Some foods and medicines can affect how hepatitis B vaccine works. Tell your doctor if you are using any of the following:  ¨ Cancer medicine  ¨ Corticosteroid medicine (such as dexamethasone, hydrocortisone, methylprednisolone, prednisolone, prednisone)  Warnings While Using This Medicine:   · Tell your doctor if you are pregnant or breastfeeding, or if you have a weak immune system (such as from a disease or medicine the suppresses the immune system). Tell your doctor if you are allergic to latex or if you are on dialysis. · This vaccine may not protect you against hepatitis B infection if you are already infected with the virus at the time you receive the shot.   Possible Side Effects While Using This Medicine:   Call your doctor right away if you notice any of these side effects:  · Allergic reaction: Itching or hives, swelling in your face or hands, swelling or tingling in your mouth or throat, chest tightness, trouble breathing  · Blistering, peeling, or red skin rash  · Lightheadedness or fainting  If you notice these less serious side effects, talk with your doctor:   · Headache, dizziness  · Pain, redness, swelling, or a lump under your skin where the shot is given  · Tiredness  If you notice other side effects that you think are caused by this medicine, tell your doctor. Call your doctor for medical advice about side effects. You may report side effects to FDA at 2-583-SQJ-3172  © 2017 [facility] Information is for End User's use only and may not be sold, redistributed or otherwise used for commercial purposes. The above information is an  only. It is not intended as medical advice for individual conditions or treatments. Talk to your doctor, nurse or pharmacist before following any medical regimen to see if it is safe and effective for you.

## 2017-08-30 RX ORDER — NAPROXEN 500 MG/1
TABLET ORAL
Qty: 60 TAB | Refills: 0 | Status: SHIPPED | OUTPATIENT
Start: 2017-08-30 | End: 2017-10-19 | Stop reason: SDUPTHER

## 2017-10-17 RX ORDER — ONDANSETRON 4 MG/1
4 TABLET, ORALLY DISINTEGRATING ORAL
Qty: 15 TAB | Refills: 0 | Status: SHIPPED | OUTPATIENT
Start: 2017-10-17 | End: 2018-10-09 | Stop reason: SDUPTHER

## 2017-10-19 RX ORDER — NAPROXEN 500 MG/1
TABLET ORAL
Qty: 60 TAB | Refills: 0 | Status: SHIPPED | OUTPATIENT
Start: 2017-10-19 | End: 2018-02-18 | Stop reason: SDUPTHER

## 2017-11-02 LAB
BASOPHILS # BLD AUTO: 0 X10E3/UL (ref 0–0.2)
BASOPHILS NFR BLD AUTO: 0 %
EOSINOPHIL # BLD AUTO: 0 X10E3/UL (ref 0–0.4)
EOSINOPHIL NFR BLD AUTO: 0 %
ERYTHROCYTE [DISTWIDTH] IN BLOOD BY AUTOMATED COUNT: 13.3 % (ref 12.3–15.4)
ERYTHROCYTE [SEDIMENTATION RATE] IN BLOOD BY WESTERGREN METHOD: 22 MM/HR (ref 0–32)
FERRITIN SERPL-MCNC: 108 NG/ML (ref 15–150)
HCT VFR BLD AUTO: 35.3 % (ref 34–46.6)
HGB BLD-MCNC: 11.8 G/DL (ref 11.1–15.9)
IMM GRANULOCYTES # BLD: 0 X10E3/UL (ref 0–0.1)
IMM GRANULOCYTES NFR BLD: 0 %
IRON SATN MFR SERPL: 11 % (ref 15–55)
IRON SERPL-MCNC: 38 UG/DL (ref 27–159)
LYMPHOCYTES # BLD AUTO: 2.8 X10E3/UL (ref 0.7–3.1)
LYMPHOCYTES NFR BLD AUTO: 24 %
MCH RBC QN AUTO: 27.3 PG (ref 26.6–33)
MCHC RBC AUTO-ENTMCNC: 33.4 G/DL (ref 31.5–35.7)
MCV RBC AUTO: 82 FL (ref 79–97)
MONOCYTES # BLD AUTO: 1 X10E3/UL (ref 0.1–0.9)
MONOCYTES NFR BLD AUTO: 9 %
NEUTROPHILS # BLD AUTO: 7.7 X10E3/UL (ref 1.4–7)
NEUTROPHILS NFR BLD AUTO: 67 %
PLATELET # BLD AUTO: 468 X10E3/UL (ref 150–379)
RBC # BLD AUTO: 4.33 X10E6/UL (ref 3.77–5.28)
TIBC SERPL-MCNC: 345 UG/DL (ref 250–450)
UIBC SERPL-MCNC: 307 UG/DL (ref 131–425)
WBC # BLD AUTO: 11.6 X10E3/UL (ref 3.4–10.8)

## 2017-11-09 ENCOUNTER — OFFICE VISIT (OUTPATIENT)
Dept: ONCOLOGY | Age: 22
End: 2017-11-09

## 2017-11-09 VITALS
OXYGEN SATURATION: 98 % | DIASTOLIC BLOOD PRESSURE: 69 MMHG | SYSTOLIC BLOOD PRESSURE: 124 MMHG | HEIGHT: 63 IN | TEMPERATURE: 98.4 F | HEART RATE: 86 BPM | BODY MASS INDEX: 31.54 KG/M2 | WEIGHT: 178 LBS | RESPIRATION RATE: 20 BRPM

## 2017-11-09 DIAGNOSIS — D75.839 THROMBOCYTOSIS: Primary | ICD-10-CM

## 2017-11-09 DIAGNOSIS — E61.1 IRON DEFICIENCY: ICD-10-CM

## 2017-11-09 DIAGNOSIS — D72.9 NEUTROPHILIA: ICD-10-CM

## 2017-11-09 NOTE — PROGRESS NOTES
Cancer [place] at [address], [address] [place] [address]  W: [phone]  F: [phone]      Reason for Visit:   Nita Briscoe is a 23 y.o. female who is seen for follow up of thrombocytosis, leukocytosis. History of Present Illness:   She returns today to review her test results. She reports taking the iron daily, sometimes BID. Tolerating it well. Fatigue persists. Menses no longer heavy, taking OCPs. No fevers, chills, night sweats, unintentional weight loss, adenopathy. She works for a pain specialist.    PAST HISTORY: The following sections were reviewed and updated in the EMR as appropriate: PMH, SH, FH, Medications, Allergies. Allergies   Allergen Reactions    Latex Hives    Pineapple Hives      Review of Systems: A complete review of systems was obtained, reviewed, and scanned into the EMR. Pertinent findings reviewed above. Physical Exam:     Visit Vitals    /69 (BP 1 Location: Right arm, BP Patient Position: Sitting)  Comment: .  Pulse 86    Temp 98.4 °F (36.9 °C) (Oral)    Resp 20    Ht 5' 3\" (1.6 m)    Wt 178 lb (80.7 kg)    SpO2 98%    BMI 31.53 kg/m2     General: No distress  Eyes: PERRLA, anicteric sclerae  HENT: Atraumatic, OP clear  Neck: Supple  Lymphatic: No cervical, supraclavicular, or inguinal adenopathy  Respiratory: CTAB, normal respiratory effort  CV: Normal rate, regular rhythm, no murmurs, no peripheral edema  GI: Soft, nontender, nondistended, no masses, no hepatomegaly, no splenomegaly  MS: Normal gait and station. Digits without clubbing or cyanosis. Skin: No rashes, ecchymoses, or petechiae. Normal temperature, turgor, and texture.   Psych: Alert, oriented, appropriate affect, normal judgment/insight    Results:     Lab Results   Component Value Date/Time    WBC 11.6 11/01/2017 04:19 PM    HGB 11.8 11/01/2017 04:19 PM    HCT 35.3 11/01/2017 04:19 PM    PLATELET 203 48/06/8854 04:19 PM MCV 82 11/01/2017 04:19 PM    ABS. NEUTROPHILS 7.7 11/01/2017 04:19 PM     Lab Results   Component Value Date/Time    Sodium 142 01/24/2017 08:42 AM    Potassium 4.6 01/24/2017 08:42 AM    Chloride 100 01/24/2017 08:42 AM    CO2 27 01/24/2017 08:42 AM    Glucose 82 01/24/2017 08:42 AM    BUN 9 01/24/2017 08:42 AM    Creatinine 0.86 01/24/2017 08:42 AM    GFR est  01/24/2017 08:42 AM    GFR est non-AA 96 01/24/2017 08:42 AM    Calcium 9.7 01/24/2017 08:42 AM     Lab Results   Component Value Date/Time    Bilirubin, total 0.3 01/24/2017 08:42 AM    ALT (SGPT) 9 01/24/2017 08:42 AM    AST (SGOT) 13 01/24/2017 08:42 AM    Alk. phosphatase 75 01/24/2017 08:42 AM    Protein, total 7.5 01/24/2017 08:42 AM    Albumin 4.5 01/24/2017 08:42 AM     Lab Results   Component Value Date/Time    Iron % saturation 11 11/01/2017 04:19 PM    TIBC 345 11/01/2017 04:19 PM    Ferritin 108 11/01/2017 04:19 PM     08/09/2017 09:06 AM    Sed rate (ESR) 22 11/01/2017 04:19 PM    Sed rate, automated 45 08/09/2017 09:06 AM    C-Reactive protein 1.91 08/09/2017 09:06 AM    TSH 1.050 01/24/2017 08:42 AM     PLATELET   Date Value   11/01/2017 468 x10E3/uL (H)   08/09/2017 414 K/uL (H)   07/19/2017 485 x10E3/uL (H)   03/21/2017 405 x10E3/uL (H)   01/24/2017 437 x10E3/uL (H)       Smear 8/9/2017:  Normochromic normocytic RBC's. Reactive lymphocytes and rare basophil. Thrombocytosis. Assessment:   1) Thrombocytosis  Mild, with platelets ranging from 405 to 485 since January. Persists despite trial of oral iron and improvement in ferritin. I wonder about a MPN such as ET. However, if she does have ET, she would be low risk based on age and lack of VTE, and no treatment would be indicated at this time. I will monitor for now, and check some additional labs (JAK2, etc) with her next lab draw. Discussed starting ASA 81mg daily. 2) Neutrophilia  Intermittent. As above, worrisome for MPN.   Sed rate has returned to normal, which would argue against an inflammatory etiology. Labwork otherwise unremarkable. Check additional labs with next lab draw as above. 3) Menorrhagia  Improved on OCPs.     Plan:     · Labs in 6 months: CBC, iron profile, ferritin, JAK2 with reflex testing  · Return to see me in 6 months        Signed By: Migel Gamez MD     November 9, 2017

## 2017-11-16 ENCOUNTER — PATIENT MESSAGE (OUTPATIENT)
Dept: ONCOLOGY | Age: 22
End: 2017-11-16

## 2017-11-20 ENCOUNTER — TELEPHONE (OUTPATIENT)
Dept: ONCOLOGY | Age: 22
End: 2017-11-20

## 2017-11-20 NOTE — TELEPHONE ENCOUNTER
Pt called stating she sent a message in [employer] to the nurse concerning tingling in her feet and her vision getting a little blurry and stated her symptoms are getting worse.  Call back number [phone] or she can be messaged in [employer]

## 2017-11-22 NOTE — TELEPHONE ENCOUNTER
She returned my call. She describes intermittent numbness/tingling/pain in bilateral legs, like pins and needles. Worse when lying down for awhile, improved when she gets up and moves around. She was worried that the symptoms may be related to her high platelets. I don't think this is likely. I am not certain what is causing the symptoms. Peripheral artery disease? Would be odd for someone her age. Neuropathy? We discussed options, including referral to another specialist, but I suggested she first follow up with her PCP to see if he has any ideas. She plans to contact their office soon.

## 2017-11-22 NOTE — TELEPHONE ENCOUNTER
----- Message from Toma Hunter NP sent at 11/21/2017  1:51 PM EST -----  Regarding: FW: Non-Urgent Medical Question  Contact: [phone]      ----- Message -----     From: Raheem Quigley     Sent: 11/20/2017   2:20 PM       To: Toma Hunter NP  Subject: RE: Non-Urgent Medical Question                  I've had a recent eye exam and mentioned it to her and she couldn't find anything. I was told by Dr. Madiha Zazueta that those symptoms could be coming from the ET which I was concerned about.     ----- Message -----  From: Toma Hunter NP  Sent: 11/20/17, 2:18 PM  To: Eh Mckay  Subject: RE: Non-Urgent Medical Question    Mrs. Doris Malone,     I am uncertain what is causing your symptoms. I don't believe that blurry vision or tingling should be due to your blood count issues or iron deficiency. I would follow up with your PCP and/or eye doctor to discuss if additional evaluation is needed. Bhavin Crawford  MS, [facility] Oncology Nurse Practitioner  [employer] at [address] [place] [address], [address] [place], [place] [address]man: [phone] F: [phone]     Good Help to Those in [address] [place] [address]      ----- Message -----     From: Eh Kruger. Doris Malone     Sent: 11/16/2017 10:45 PM EST       To: Warren Calvert MD  Subject: Non-Urgent Medical Question    I'm starting to have some odd symptoms such as tingling in my feet it basically feels like my foot is constantly falling asleep but the weird thing is that I'm usually standing when it happens and most recently my vision gets a little blurry and that's complicated when wearing contacts. Is there anything I can do/try ?

## 2017-11-22 NOTE — TELEPHONE ENCOUNTER
[employer] at [facility]  [phone]    I do not think her current symptoms (tingling in feet and blurry vision) are likely to be related to her thrombocytosis. I called patient to discuss her concerns. No answer, left message for her to call me back.

## 2018-01-19 ENCOUNTER — OFFICE VISIT (OUTPATIENT)
Dept: FAMILY MEDICINE CLINIC | Age: 23
End: 2018-01-19

## 2018-01-19 VITALS
HEART RATE: 86 BPM | BODY MASS INDEX: 32.43 KG/M2 | WEIGHT: 183 LBS | RESPIRATION RATE: 18 BRPM | OXYGEN SATURATION: 97 % | DIASTOLIC BLOOD PRESSURE: 84 MMHG | HEIGHT: 63 IN | TEMPERATURE: 98.6 F | SYSTOLIC BLOOD PRESSURE: 110 MMHG

## 2018-01-19 DIAGNOSIS — Z00.00 PHYSICAL EXAM: Primary | ICD-10-CM

## 2018-01-19 DIAGNOSIS — E28.2 PCOS (POLYCYSTIC OVARIAN SYNDROME): ICD-10-CM

## 2018-01-19 DIAGNOSIS — Z23 ENCOUNTER FOR IMMUNIZATION: ICD-10-CM

## 2018-01-19 RX ORDER — ACETAMINOPHEN 500 MG
TABLET ORAL 2 TIMES DAILY
COMMUNITY

## 2018-01-19 RX ORDER — METFORMIN HYDROCHLORIDE 500 MG/1
500 TABLET ORAL 2 TIMES DAILY WITH MEALS
Qty: 60 TAB | Refills: 11 | Status: SHIPPED | OUTPATIENT
Start: 2018-01-19 | End: 2018-05-10

## 2018-01-19 NOTE — PROGRESS NOTES
Jordon Salazar is a 23 y.o. female   Chief Complaint   Patient presents with    Complete Physical    pt here for CPE and pt will need her 3rd Hep B and then will need a titer in a month for school. Pt otherwise doing well. Pt is having pap done today with GYN Dr Peters Sat with [facility]. Chief Complaint   Patient presents with    Complete Physical     she is a 23y.o. year old female who presents for evalution. Reviewed PmHx, RxHx, FmHx, SocHx, AllgHx and updated and dated in the chart.     Review of Systems - negative except as listed above in the HPI    Objective:     Vitals:    01/19/18 0802   BP: 110/84   Pulse: 86   Resp: 18   Temp: 98.6 °F (37 °C)   TempSrc: Oral   SpO2: 97%   Weight: 183 lb (83 kg)   Height: 5' 3\" (1.6 m)     Physical Examination: General appearance - alert, well appearing, and in no distress  Mental status - alert, oriented to person, place, and time  Eyes - pupils equal and reactive, extraocular eye movements intact  Ears - bilateral TM's and external ear canals normal  Nose - normal and patent, no erythema, discharge or polyps  Mouth - mucous membranes moist, pharynx normal without lesions  Neck - supple, no significant adenopathy  Lymphatics - no palpable lymphadenopathy, no hepatosplenomegaly  Chest - clear to auscultation, no wheezes, rales or rhonchi, symmetric air entry  Heart - normal rate, regular rhythm, normal S1, S2, no murmurs, rubs, clicks or gallops  Abdomen - soft, nontender, nondistended, no masses or organomegaly  Back exam - full range of motion, no tenderness, palpable spasm or pain on motion  Neurological - alert, oriented, normal speech, no focal findings or movement disorder noted  Musculoskeletal - no joint tenderness, deformity or swelling  Extremities - peripheral pulses normal, no pedal edema, no clubbing or cyanosis  Skin - normal coloration and turgor, no rashes, no suspicious skin lesions noted    Assessment/ Plan:   Diagnoses and all orders for this visit:    1. Physical exam  -     HEP B SURFACE AB; Future  -     METABOLIC PANEL, COMPREHENSIVE  -     LIPID PANEL  -     TSH 3RD GENERATION  -     URINALYSIS W/ RFLX MICROSCOPIC    2. Encounter for immunization  -     Hepatitis B vaccine, adult dosage, IM  -     [place] IMMUNIZ ADMIN,1 SINGLE/COMB VAC/TOXOID    3. PCOS (polycystic ovarian syndrome)  -     metFORMIN (GLUCOPHAGE) 500 mg tablet; Take 1 Tab by mouth two (2) times daily (with meals). -Patient is in good health  -Discussed with patient cancer risk factors and screens needed  -Patient needs a colonoscopy no  -Labs from previous visits were discussed with patient yes  -Discussed with patient diet and exercise=yes  -Discussed with patient testicular (male)/breast self exam (female)= no and advised to start  Follow-up Disposition:  Return in about 1 month (around 2/19/2018), or if symptoms worsen or fail to improve. I have discussed the diagnosis with the patient and the intended plan as seen in the above orders. The patient has received an after-visit summary and questions were answered concerning future plans. Pt conveyed understanding. Medication Side Effects and Warnings were discussed with patient: yes  Patient Labs were reviewed and or requested: yes  Patient Past Records were reviewed and or requested  yes    Patient Instructions   A Healthy Lifestyle: Care Instructions  Your Care Instructions    A healthy lifestyle can help you feel good, stay at a healthy weight, and have plenty of energy for both work and play. A healthy lifestyle is something you can share with your whole family. A healthy lifestyle also can lower your risk for serious health problems, such as high blood pressure, heart disease, and diabetes. You can follow a few steps listed below to improve your health and the health of your family. Follow-up care is a key part of your treatment and safety.  Be sure to make and go to all appointments, and call your doctor if you are having problems. It's also a good idea to know your test results and keep a list of the medicines you take. How can you care for yourself at home? · Do not eat too much sugar, fat, or fast foods. You can still have dessert and treats now and then. The goal is moderation. · Start small to improve your eating habits. Pay attention to portion sizes, drink less juice and soda pop, and eat more fruits and vegetables. ¨ Eat a healthy amount of food. A 3-ounce serving of meat, for example, is about the size of a deck of cards. Fill the rest of your plate with vegetables and whole grains. ¨ Limit the amount of soda and sports drinks you have every day. Drink more water when you are thirsty. ¨ Eat at least 5 servings of fruits and vegetables every day. It may seem like a lot, but it is not hard to reach this goal. A serving or helping is 1 piece of fruit, 1 cup of vegetables, or 2 cups of leafy, raw vegetables. Have an apple or some carrot sticks as an afternoon snack instead of a candy bar. Try to have fruits and/or vegetables at every meal.  · Make exercise part of your daily routine. You may want to start with simple activities, such as walking, bicycling, or slow swimming. Try to be active 30 to 60 minutes every day. You do not need to do all 30 to 60 minutes all at once. For example, you can exercise 3 times a day for 10 or 20 minutes. Moderate exercise is safe for most people, but it is always a good idea to talk to your doctor before starting an exercise program.  · Keep moving. [place] Hoop the lawn, work in the garden, or [employer]. Take the stairs instead of the elevator at work. · If you smoke, quit. People who smoke have an increased risk for heart attack, stroke, cancer, and other lung illnesses. Quitting is hard, but there are ways to boost your chance of quitting tobacco for good. ¨ Use nicotine gum, patches, or lozenges.   ¨ Ask your doctor about stop-smoking programs and medicines. ¨ Keep trying. In addition to reducing your risk of diseases in the future, you will notice some benefits soon after you stop using tobacco. If you have shortness of breath or asthma symptoms, they will likely get better within a few weeks after you quit. · Limit how much alcohol you drink. Moderate amounts of alcohol (up to 2 drinks a day for men, 1 drink a day for women) are okay. But drinking too much can lead to liver problems, high blood pressure, and other health problems. Family health  If you have a family, there are many things you can do together to improve your health. · Eat meals together as a family as often as possible. · Eat healthy foods. This includes fruits, vegetables, lean meats and dairy, and whole grains. · Include your family in your fitness plan. Most people think of activities such as jogging or tennis as the way to fitness, but there are many ways you and your family can be more active. Anything that makes you breathe hard and gets your heart pumping is exercise. Here are some tips:  ¨ Walk to do errands or to take your child to school or the bus. ¨ Go for a family bike ride after dinner instead of watching TV. Where can you learn more? Go to http://tracey-quang.info/. Enter W686 in the search box to learn more about \"A Healthy Lifestyle: Care Instructions. \"  Current as of: May 12, 2017  Content Version: 11.4  © 1353-3494 [employer]. Care instructions adapted under license by [employer] (which disclaims liability or warranty for this information). If you have questions about a medical condition or this instruction, always ask your healthcare professional. [address] any warranty or liability for your use of this information.             Dr. Srinivas Haider

## 2018-01-19 NOTE — PATIENT INSTRUCTIONS

## 2018-01-22 LAB
ALBUMIN SERPL-MCNC: 4.8 G/DL (ref 3.5–5.5)
ALBUMIN/GLOB SERPL: 1.5 {RATIO} (ref 1.2–2.2)
ALP SERPL-CCNC: 74 IU/L (ref 39–117)
ALT SERPL-CCNC: 14 IU/L (ref 0–32)
APPEARANCE UR: CLEAR
AST SERPL-CCNC: 17 IU/L (ref 0–40)
BACTERIA #/AREA URNS HPF: NORMAL /[HPF]
BILIRUB SERPL-MCNC: 0.3 MG/DL (ref 0–1.2)
BILIRUB UR QL STRIP: NEGATIVE
BUN SERPL-MCNC: 13 MG/DL (ref 6–20)
BUN/CREAT SERPL: 17 (ref 9–23)
CALCIUM SERPL-MCNC: 9.9 MG/DL (ref 8.7–10.2)
CASTS URNS QL MICRO: NORMAL /LPF
CHLORIDE SERPL-SCNC: 96 MMOL/L (ref 96–106)
CHOLEST SERPL-MCNC: 213 MG/DL (ref 100–199)
CO2 SERPL-SCNC: 27 MMOL/L (ref 18–29)
COLOR UR: YELLOW
CREAT SERPL-MCNC: 0.75 MG/DL (ref 0.57–1)
EPI CELLS #/AREA URNS HPF: NORMAL /HPF
GLOBULIN SER CALC-MCNC: 3.3 G/DL (ref 1.5–4.5)
GLUCOSE SERPL-MCNC: 81 MG/DL (ref 65–99)
GLUCOSE UR QL: NEGATIVE
HDLC SERPL-MCNC: 56 MG/DL
HGB UR QL STRIP: NEGATIVE
INTERPRETATION, 910389: NORMAL
KETONES UR QL STRIP: NEGATIVE
LDLC SERPL CALC-MCNC: 118 MG/DL (ref 0–99)
LEUKOCYTE ESTERASE UR QL STRIP: ABNORMAL
MICRO URNS: ABNORMAL
MUCOUS THREADS URNS QL MICRO: PRESENT
NITRITE UR QL STRIP: NEGATIVE
PH UR STRIP: 6.5 [PH] (ref 5–7.5)
POTASSIUM SERPL-SCNC: 4 MMOL/L (ref 3.5–5.2)
PROT SERPL-MCNC: 8.1 G/DL (ref 6–8.5)
PROT UR QL STRIP: NEGATIVE
RBC #/AREA URNS HPF: NORMAL /HPF
SODIUM SERPL-SCNC: 141 MMOL/L (ref 134–144)
SP GR UR: 1.02 (ref 1–1.03)
TRIGL SERPL-MCNC: 197 MG/DL (ref 0–149)
TSH SERPL DL<=0.005 MIU/L-ACNC: 2.52 UIU/ML (ref 0.45–4.5)
UROBILINOGEN UR STRIP-MCNC: 0.2 MG/DL (ref 0.2–1)
VLDLC SERPL CALC-MCNC: 39 MG/DL (ref 5–40)
WBC #/AREA URNS HPF: NORMAL /HPF

## 2018-01-23 NOTE — PROGRESS NOTES
Your cholesterol has increased some. Please ensure you are following a healthy diet and we can recheck this with your next physical.  Otherwise your labs look good! .  Let me know if you have any questions.     Dr Waldron Shadow

## 2018-02-20 RX ORDER — NAPROXEN 500 MG/1
TABLET ORAL
Qty: 60 TAB | Refills: 0 | Status: SHIPPED | OUTPATIENT
Start: 2018-02-20 | End: 2018-05-22 | Stop reason: SDUPTHER

## 2018-02-20 NOTE — TELEPHONE ENCOUNTER
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

## 2018-03-28 ENCOUNTER — PATIENT MESSAGE (OUTPATIENT)
Dept: ONCOLOGY | Age: 23
End: 2018-03-28

## 2018-04-20 LAB
BACKGROUND, 081113: NORMAL
BACKGROUND, 489163: NORMAL
BACKGROUND: 480503: NORMAL
BASOPHILS # BLD AUTO: 0 X10E3/UL (ref 0–0.2)
BASOPHILS NFR BLD AUTO: 0 %
CALR MUTATION DETECTION RESULT, 489451: NORMAL
COMMENT, 489419: NORMAL
EOSINOPHIL # BLD AUTO: 0.1 X10E3/UL (ref 0–0.4)
EOSINOPHIL NFR BLD AUTO: 0 %
ERYTHROCYTE [DISTWIDTH] IN BLOOD BY AUTOMATED COUNT: 13.1 % (ref 12.3–15.4)
EXTRACTION: NORMAL
FERRITIN SERPL-MCNC: 94 NG/ML (ref 15–150)
HCT VFR BLD AUTO: 37.4 % (ref 34–46.6)
HGB BLD-MCNC: 12 G/DL (ref 11.1–15.9)
IMM GRANULOCYTES # BLD: 0 X10E3/UL (ref 0–0.1)
IMM GRANULOCYTES NFR BLD: 0 %
INDICATIONS, 489415: NORMAL
IRON SATN MFR SERPL: 12 % (ref 15–55)
IRON SERPL-MCNC: 36 UG/DL (ref 27–159)
JAK2 GENE MUT ANL BLD/T: NORMAL
JAK2 P.V617F BLD/T QL: NORMAL
LAB DIRECTOR NAME PROVIDER: NORMAL
LYMPHOCYTES # BLD AUTO: 3.7 X10E3/UL (ref 0.7–3.1)
LYMPHOCYTES NFR BLD AUTO: 29 %
MCH RBC QN AUTO: 27.5 PG (ref 26.6–33)
MCHC RBC AUTO-ENTMCNC: 32.1 G/DL (ref 31.5–35.7)
MCV RBC AUTO: 86 FL (ref 79–97)
MONOCYTES # BLD AUTO: 0.6 X10E3/UL (ref 0.1–0.9)
MONOCYTES NFR BLD AUTO: 5 %
MPL GENE MUT TESTED BLD/T: NORMAL
MPL P.W515L+W515K+S505N BLD/T QL: NORMAL
NEUTROPHILS # BLD AUTO: 8.2 X10E3/UL (ref 1.4–7)
NEUTROPHILS NFR BLD AUTO: 66 %
PLATELET # BLD AUTO: 428 X10E3/UL (ref 150–379)
RBC # BLD AUTO: 4.37 X10E6/UL (ref 3.77–5.28)
REF LAB TEST METHOD: NORMAL
REF LAB TEST METHOD: NORMAL
REFERENCES, 150293: NORMAL
REFERENCES, 150293: NORMAL
REFERENCES: NORMAL
SERVICE CMNT-IMP: NORMAL
SPECIMEN SOURCE: NORMAL
TIBC SERPL-MCNC: 304 UG/DL (ref 250–450)
UIBC SERPL-MCNC: 268 UG/DL (ref 131–425)
WBC # BLD AUTO: 12.6 X10E3/UL (ref 3.4–10.8)

## 2018-04-27 ENCOUNTER — OFFICE VISIT (OUTPATIENT)
Dept: ONCOLOGY | Age: 23
End: 2018-04-27

## 2018-04-27 VITALS
DIASTOLIC BLOOD PRESSURE: 78 MMHG | HEIGHT: 63 IN | TEMPERATURE: 98.6 F | HEART RATE: 101 BPM | RESPIRATION RATE: 16 BRPM | OXYGEN SATURATION: 97 % | SYSTOLIC BLOOD PRESSURE: 118 MMHG | WEIGHT: 182.8 LBS | BODY MASS INDEX: 32.39 KG/M2

## 2018-04-27 DIAGNOSIS — D72.829 LEUKOCYTOSIS, UNSPECIFIED TYPE: ICD-10-CM

## 2018-04-27 DIAGNOSIS — D75.839 THROMBOCYTOSIS: Primary | ICD-10-CM

## 2018-04-27 RX ORDER — ASPIRIN 81 MG/1
TABLET ORAL DAILY
COMMUNITY

## 2018-04-27 NOTE — PROGRESS NOTES
Cancer [place] at [address], [address] [place] [address]  W: [phone]  F: [phone]      Reason for Visit:   Idalmis Monique is a 23 y.o. female who is seen for follow up of thrombocytosis, leukocytosis. History of Present Illness:   She returns today for follow up and lab results. She states she continues on iron daily and is taking low dose aspirin as well. Fatigue unchanged. Denies fevers, chills, night sweats, unintentional weight loss, adenopathy. She is unaccompanied today. She works for a pain specialist.    PAST HISTORY: The following sections were reviewed and updated in the EMR as appropriate: PMH, SH, FH, Medications, Allergies. Allergies   Allergen Reactions    Latex Hives    Pineapple Hives      Review of Systems: A complete review of systems was obtained, reviewed, and scanned into the EMR. Pertinent findings reviewed above. Physical Exam:     Visit Vitals    /78 (BP 1 Location: Left arm, BP Patient Position: Sitting)    Pulse (!) 101    Temp 98.6 °F (37 °C) (Oral)    Resp 16    Ht 5' 3\" (1.6 m)    Wt 182 lb 12.8 oz (82.9 kg)    SpO2 97%    BMI 32.38 kg/m2     General: No distress  Eyes: PERRLA, anicteric sclerae  HENT: Atraumatic, OP clear  Neck: Supple  Lymphatic: No cervical, supraclavicular, or inguinal adenopathy  Respiratory: CTAB, normal respiratory effort  CV: Normal rate, regular rhythm, no murmurs, no peripheral edema  GI: Soft, nontender, nondistended, no masses, no hepatomegaly, no splenomegaly  MS: Normal gait and station. Digits without clubbing or cyanosis. Skin: No rashes, ecchymoses, or petechiae. Normal temperature, turgor, and texture.   Psych: Alert, oriented, appropriate affect, normal judgment/insight    Results:     Lab Results   Component Value Date/Time    WBC 12.6 (H) 04/11/2018 01:25 PM    HGB 12.0 04/11/2018 01:25 PM    HCT 37.4 04/11/2018 01:25 PM    PLATELET 859 (H) 77/00/9779 01:25 PM    MCV 86 04/11/2018 01:25 PM    ABS. NEUTROPHILS 8.2 (H) 04/11/2018 01:25 PM     Lab Results   Component Value Date/Time    Sodium 141 01/19/2018 08:22 AM    Potassium 4.0 01/19/2018 08:22 AM    Chloride 96 01/19/2018 08:22 AM    CO2 27 01/19/2018 08:22 AM    Glucose 81 01/19/2018 08:22 AM    BUN 13 01/19/2018 08:22 AM    Creatinine 0.75 01/19/2018 08:22 AM    GFR est  01/19/2018 08:22 AM    GFR est non- 01/19/2018 08:22 AM    Calcium 9.9 01/19/2018 08:22 AM     Lab Results   Component Value Date/Time    Bilirubin, total 0.3 01/19/2018 08:22 AM    ALT (SGPT) 14 01/19/2018 08:22 AM    AST (SGOT) 17 01/19/2018 08:22 AM    Alk. phosphatase 74 01/19/2018 08:22 AM    Protein, total 8.1 01/19/2018 08:22 AM    Albumin 4.8 01/19/2018 08:22 AM     Lab Results   Component Value Date/Time    Iron % saturation 12 (L) 04/11/2018 01:25 PM    TIBC 304 04/11/2018 01:25 PM    Ferritin 94 04/11/2018 01:25 PM     08/09/2017 09:06 AM    Sed rate (ESR) 22 11/01/2017 04:19 PM    Sed rate, automated 45 (H) 08/09/2017 09:06 AM    C-Reactive protein 1.91 (H) 08/09/2017 09:06 AM    TSH 2.520 01/19/2018 08:22 AM     PLATELET   Date Value   04/11/2018 428 x10E3/uL (H)   11/01/2017 468 x10E3/uL (H)   08/09/2017 414 K/uL (H)   07/19/2017 485 x10E3/uL (H)   03/21/2017 405 x10E3/uL (H)   01/24/2017 437 x10E3/uL (H)       Smear 8/9/2017: Normochromic normocytic RBC's. Reactive lymphocytes and rare basophil. Thrombocytosis. 4/11/2018  JAK2 V617F negative  JAK2 exons 12-15 negative  CALR: negative    Assessment:   1) Thrombocytosis  Mild, with platelets ranging from 405 to 485 since 1/2017. Persists despite trial of oral iron and improvement in ferritin. It is possible that she has an MPN such as ET, though JAK2 and CALR testing was negative. However, if she does have ET, she would be low risk based on age, lack of VTE, and negative JAK2. No treatment would be indicated at this time.   We discussed this in detail today. We discussed the option of a bone marrow biopsy, though I explained that ET is sometimes difficult to diagnose when platelets levels are only mildly elevated. At the conclusion of this, we decided to monitor now with repeat labs in 6 months. Continue ASA 81mg PO daily. 2) Neutrophilia  Intermittent. Additional labwork  unremarkable. Monitor with repeat labs. 3) Menorrhagia  Improved on OCPs. Plan:     · Labs in 6 months: CBC ([facility])  · Return to clinic in 6 months    Patient was seen in conjunction with Samira Horton NP.       Signed By: Jayla Moralez MD

## 2018-05-10 DIAGNOSIS — E28.2 PCOS (POLYCYSTIC OVARIAN SYNDROME): Primary | ICD-10-CM

## 2018-05-10 RX ORDER — METFORMIN HYDROCHLORIDE 500 MG/5ML
5 SOLUTION ORAL 2 TIMES DAILY WITH MEALS
Qty: 300 ML | Refills: 5 | Status: SHIPPED | OUTPATIENT
Start: 2018-05-10

## 2018-05-17 ENCOUNTER — TELEPHONE (OUTPATIENT)
Dept: FAMILY MEDICINE CLINIC | Age: 23
End: 2018-05-17

## 2018-05-17 NOTE — TELEPHONE ENCOUNTER
Patient has been waiting for pa for liquid metformin that she emailed with Dr Elda Teran.  Please call her @[phone] to give her status

## 2018-05-18 NOTE — TELEPHONE ENCOUNTER
Riomet 500mg/ 5ml submitted to [facility] ([id number]) via cover my meds. Awaiting response.    Key: [id number] - [place] Case ID: [id number]

## 2018-05-18 NOTE — TELEPHONE ENCOUNTER
----- Message from Willian Lee DO sent at 5/10/2018 10:28 AM EDT -----  Regarding: FW: Non-Urgent Medical Question  Contact: [phone]      ----- Message -----     From: Ari Valdovinos     Sent: 5/10/2018  10:08 AM       To: Willian Lee DO  Subject: RE: Non-Urgent Medical Question                  Got it. The pharmacy just called me they said it requires a prior authorization. I explained to her that I couldn't handle the pills because I can't swallow and the actual taste is unbearable. So it should be coming to your office. Thanks again!    ----- Message -----  From: Willian Lee DO  Sent: 5/10/18, 9:51 AM  To: Ryne Mckay  Subject: RE: Non-Urgent Medical Question    Liquid sent in, if not covered you'll need to consult with your OB for other options    ----- Message -----     From: Ryne Varghese. Demian Lackeyal: 5/10/2018  9:27 AM EDT       To: Willian Lee DO  Subject: RE: Non-Urgent Medical Question    It says not covered but that it was $60. So I'm not entirely sure what that means. If the liquid is not covered or too pricey because I'm not sure of the cost is there another one I can try ?    ----- Message -----  From: Willian Lee DO  Sent: 5/10/18, 9:22 AM  To: Ryne Mckay  Subject: RE: Non-Urgent Medical Question    Metformin does come in a liquid, however, insurance tends to not cover this. So this is a possible option but check with your insurance first.    ----- Message -----     From: Ryne Varghese. Temo Anderson     Sent: 5/9/2018  8:00 AM EDT       To: Willian Lee DO  Subject: Non-Urgent Medical Question    Dr. Luan Urban,    I have a slight problem and I'm not sure how to fix it. I am supposed to be taking Metformin but I can't. I cannot take/swallow pills. I have to crush all pill medications which is why I try to get as many liquids and will not take any ER medications to avoid that problem. The taste of metformin is absolutely horrible, it taste like sour milk.  It makes so hard to take it because it makes me gag, serena tried it every way like splitting it up in the am and pm with breakfast and dinner but then I get that awful taste twice. I drink juice with it to help the taste and it does not help. However I'm in the process of seeing a RE (Dr. Houser Smoker) when at last check my Hemoglobin A1c was 5.5 which I was told was borderline. I didn't know if there was a liquid that was available or if there was another medication like metformin that might be in a liquid or something because I'm at a loss and will have to stop taking it all together.

## 2018-05-22 RX ORDER — NAPROXEN 500 MG/1
TABLET ORAL
Qty: 60 TAB | Refills: 0 | Status: SHIPPED | OUTPATIENT
Start: 2018-05-22 | End: 2018-12-19 | Stop reason: SDUPTHER

## 2018-05-22 NOTE — TELEPHONE ENCOUNTER
From: Bonita Rea  To: Jeanie Ernandez DO  Sent: 5/22/2018 7:41 AM EDT  Subject: Medication Renewal Request    Original authorizing provider: DO Karin Sanchez would like a refill of the following medications:  naproxen (NAPROSYN) 500 mg tablet Jeanie Ernandez DO]    Preferred pharmacy: [place] [address] [place] [address]:

## 2018-06-25 DIAGNOSIS — Z11.1 SCREENING-PULMONARY TB: Primary | ICD-10-CM

## 2018-08-21 ENCOUNTER — TELEPHONE (OUTPATIENT)
Dept: FAMILY MEDICINE CLINIC | Age: 23
End: 2018-08-21

## 2018-08-21 NOTE — TELEPHONE ENCOUNTER
Patient requesting that a lab order for quantiferon TB be faxed to her at [phone]    She lost the one you gave her in June.

## 2018-08-22 ENCOUNTER — TELEPHONE (OUTPATIENT)
Dept: FAMILY MEDICINE CLINIC | Age: 23
End: 2018-08-22

## 2018-08-27 ENCOUNTER — HOSPITAL ENCOUNTER (OUTPATIENT)
Dept: VASCULAR SURGERY | Age: 23
Discharge: HOME | End: 2018-08-27
Attending: NURSE PRACTITIONER
Payer: COMMERCIAL

## 2018-08-27 DIAGNOSIS — M79.605 LEFT LEG PAIN: ICD-10-CM

## 2018-08-27 DIAGNOSIS — D75.839 THROMBOCYTOSIS: Primary | ICD-10-CM

## 2018-08-27 DIAGNOSIS — D75.839 THROMBOCYTOSIS: ICD-10-CM

## 2018-08-27 PROCEDURE — 93971 EXTREMITY STUDY: CPT

## 2018-08-27 NOTE — PROCEDURES
[facility]  *** FINAL REPORT ***    Name: Mary Lou Villegas  MRN: [id number]    Outpatient  : 1995  HIS Order #: [id number]  [address] [facility] Visit #: [id number]  Date: 27 Aug 2018    TYPE OF TEST: Peripheral Venous Testing    REASON FOR TEST  Pain in limb    Left Leg:-  Deep venous thrombosis:           No  Superficial venous thrombosis:    No  Deep venous insufficiency:        No  Superficial venous insufficiency: No      INTERPRETATION/FINDINGS  PROCEDURE:  LEFT LOWER EXTREMITY VENOUS DUPLEX . Evaluation of lower  extremity veins with ultrasound (B-mode imaging, pulsed Doppler, color   Doppler). Includes the common femoral, deep femoral, femoral,  popliteal, posterior tibial, peroneal, and great saphenous veins. Other veins, for example the gastrocnemius and soleal veins, may also  be visualized. FINDINGS: Cynda Jewel scale and color flow duplex images of the veins in the  left lower extremity demonstrate normal compressibility, spontaneous  and augmented flow profiles, and absence of filling defects throughout   the deep and superficial veins in the left lower extremity. CONCLUSION:  Left lower extremity venous duplex negative for deep  venous thrombosis or thrombophlebitis. There is an incidental finding  of a prominent groin lymph node on the left measuring 2.74 x 0.56 cm. Right common femoral vein is thrombus free. ADDITIONAL COMMENTS    I have personally reviewed the data relevant to the interpretation of  this  study. TECHNOLOGIST: Narcisa Oro RDCS  Signed: 2018 02:15 PM    PHYSICIAN: Jody Hall.  Nic Phelps MD  Signed: 2018 07:28 AM

## 2018-08-28 NOTE — PROGRESS NOTES
08/28/18-Informed pt of results she verbalized understanding. She will follow up with pcp if symptoms continue or worsen. No further questions or concerns.

## 2018-10-09 ENCOUNTER — OFFICE VISIT (OUTPATIENT)
Dept: FAMILY MEDICINE CLINIC | Age: 23
End: 2018-10-09

## 2018-10-09 VITALS
DIASTOLIC BLOOD PRESSURE: 82 MMHG | SYSTOLIC BLOOD PRESSURE: 125 MMHG | BODY MASS INDEX: 33.84 KG/M2 | TEMPERATURE: 98.7 F | OXYGEN SATURATION: 99 % | RESPIRATION RATE: 16 BRPM | HEIGHT: 63 IN | HEART RATE: 82 BPM | WEIGHT: 191 LBS

## 2018-10-09 DIAGNOSIS — E66.09 CLASS 1 OBESITY DUE TO EXCESS CALORIES WITHOUT SERIOUS COMORBIDITY WITH BODY MASS INDEX (BMI) OF 33.0 TO 33.9 IN ADULT: ICD-10-CM

## 2018-10-09 DIAGNOSIS — L73.2 HIDRADENITIS AXILLARIS: Primary | ICD-10-CM

## 2018-10-09 RX ORDER — ONDANSETRON 4 MG/1
4 TABLET, ORALLY DISINTEGRATING ORAL
Qty: 15 TAB | Refills: 2 | Status: SHIPPED | OUTPATIENT
Start: 2018-10-09

## 2018-10-09 RX ORDER — DOXYCYCLINE 100 MG/1
100 TABLET ORAL 2 TIMES DAILY
Qty: 20 TAB | Refills: 0 | Status: SHIPPED | OUTPATIENT
Start: 2018-10-09 | End: 2018-10-19

## 2018-10-09 NOTE — MR AVS SNAPSHOT
[address] [place] [id number] 
[phone] Patient: Michael Faye MRN: [id number] :1995 Visit Information Date & Time Provider Department Dept. Phone Encounter #  
 10/9/2018  4:00 PM Guillermo Crump [phone] [id number] Follow-up Instructions Return in about 4 weeks (around 2018), or if symptoms worsen or fail to improve. Your Appointments 10/23/2018 11:30 AM  
ESTABLISHED PATIENT with Olden Fleischer, MD  
[address] at [facility] Andrea Weinberg) Appt Note: Raddin - heme f/u.; [facility]in - heme f/u; Raddin - heme f/u  
 [address] [place] [address] [facility] [address]  
[phone]  
  
   
 [address] [place] [address] Upcoming Health Maintenance Date Due  
 HPV Age 9Y-34Y (1 of 1 - Female 3 Dose Series) 2004 Pneumococcal 19-64 Highest Risk (2 of 3 - PPSV23) 2017 Influenza Age 5 to Adult 2018 PAP AKA CERVICAL CYTOLOGY 2021 DTaP/Tdap/Td series (2 - Td) 2027 Allergies as of 10/9/2018  Review Complete On: 10/9/2018 By: Aida Justice LPN Severity Noted Reaction Type Reactions Latex  2017   Side Effect Hives Pineapple  2017   Side Effect Hives Current Immunizations  Reviewed on 2017 Name Date Hep B Vaccine (Adult) 2018, 2017, 2017 Influenza Vaccine 10/15/2016 Pneumococcal Conjugate (PCV-13) 2017, 7/15/2015 Tdap 2017 Not reviewed this visit You Were Diagnosed With   
  
 Codes Comments Hidradenitis axillaris    -  Primary ICD-10-CM: L73.2 ICD-9-CM: 705.83 Class 1 obesity due to excess calories without serious comorbidity with body mass index (BMI) of 33.0 to 33.9 in adult     ICD-10-CM: E66.09, Z68.33 
ICD-9-CM: 278.00, V85.33 Vitals BP Pulse Temp Resp Height(growth percentile) Weight(growth percentile) 125/82 82 98.7 °F (37.1 °C) (Oral) 16 5' 3\" (1.6 m) 191 lb (86.6 kg) SpO2 BMI OB Status Smoking Status 99% 33.83 kg/m2 Polycystic Ovarian Syndrome Never Smoker Vitals History BMI and BSA Data Body Mass Index Body Surface Area  
 33.83 kg/m 2 1.96 m 2 Preferred Pharmacy Pharmacy Name Phone [facility] [address] [facility] [place] [address] Your Updated Medication List  
  
   
This list is accurate as of 10/9/18  4:31 PM.  Always use your most recent med list.  
  
  
  
  
 aspirin delayed-release 81 mg tablet Take  by mouth daily. Cholecalciferol (Vitamin D3) 2,000 unit Cap capsule Commonly known as:  VITAMIN D3 Take  by mouth two (2) times a day. CLARITIN 10 mg tablet Generic drug:  loratadine Take 10 mg by mouth. doxycycline 100 mg tablet Commonly known as:  ADOXA Take 1 Tab by mouth two (2) times a day for 10 days. DULERA 100-5 mcg/actuation HFA inhaler Generic drug:  mometasone-formoterol Take 2 Puffs by inhalation two (2) times a day. ferrous sulfate 325 mg (65 mg iron) EC tablet Commonly known as:  IRON Take 1 Tab by mouth two (2) times a day. liraglutide 3 mg/0.5 mL (18 mg/3 mL) pen Commonly known as:  SAXENDA  
0.6mg subQ qday x 1 week then 1.2mg daily x 1 week then 1.8mg daily x 1 week then 2.4mg daily x 1 week  Then 3mg sub q daily  
  
 metFORMIN 500 mg/5 mL Soln Take 5 mL by mouth two (2) times daily (with meals). naproxen 500 mg tablet Commonly known as:  NAPROSYN  
TAKE 1 TABLET BY MOUTH TWICE A DAY AS NEEDED WITH MEALS  
  
 ondansetron 4 mg disintegrating tablet Commonly known as:  ZOFRAN ODT Take 1 Tab by mouth every eight (8) hours as needed for Nausea. PROAIR HFA 90 mcg/actuation inhaler Generic drug:  albuterol PROBIOTIC 4X 10-15 mg Tbec Generic drug:  B.infantis-B.ani-B.long-B.bifi Take  by mouth. SYMBICORT 160-4.5 mcg/actuation Hfaa Generic drug:  budesonide-formoterol USE 2 PUFF INHALATION BY MOUTH TWICE DAILY FOR 30 DAY(S)  
  
 XULANE 150-35 mcg/24 hr  
Generic drug:  norelgestromin-ethinyl estradiol 1 Patch by TransDERmal route Every Saturday. Prescriptions Sent to Pharmacy Refills  
 doxycycline (ADOXA) 100 mg tablet 0 Sig: Take 1 Tab by mouth two (2) times a day for 10 days. Class: Normal  
 Pharmacy: [place] [address] Ph #: [phone] Route: Oral  
 ondansetron (ZOFRAN ODT) 4 mg disintegrating tablet 2 Sig: Take 1 Tab by mouth every eight (8) hours as needed for Nausea. Class: Normal  
 Pharmacy: [place] [address] Ph #: [phone] Route: Oral  
 liraglutide (SAXENDA) 3 mg/0.5 mL (18 mg/3 mL) pen 1 Si.6mg subQ qday x 1 week then 1.2mg daily x 1 week then 1.8mg daily x 1 week then 2.4mg daily x 1 week  Then 3mg sub q daily Class: Normal  
 Pharmacy: [place] [address] Ph #: [phone] Follow-up Instructions Return in about 4 weeks (around 2018), or if symptoms worsen or fail to improve. Patient Instructions Starting a Weight Loss Plan: Care Instructions Your Care Instructions If you are thinking about losing weight, it can be hard to know where to start. Your doctor can help you set up a weight loss plan that best meets your needs. You may want to take a class on nutrition or exercise, or join a weight loss support group. If you have questions about how to make changes to your eating or exercise habits, ask your doctor about seeing a registered dietitian or an exercise specialist. 
It can be a big challenge to lose weight.  But you do not have to make huge changes at once. Make small changes, and stick with them. When those changes become habit, add a few more changes. If you do not think you are ready to make changes right now, try to pick a date in the future. Make an appointment to see your doctor to discuss whether the time is right for you to start a plan. Follow-up care is a key part of your treatment and safety. Be sure to make and go to all appointments, and call your doctor if you are having problems. It's also a good idea to know your test results and keep a list of the medicines you take. How can you care for yourself at home? · Set realistic goals. Many people expect to lose much more weight than is likely. A weight loss of 5% to 10% of your body weight may be enough to improve your health. · Get family and friends involved to provide support. Talk to them about why you are trying to lose weight, and ask them to help. They can help by participating in exercise and having meals with you, even if they may be eating something different. · Find what works best for you. If you do not have time or do not like to cook, a program that offers meal replacement bars or shakes may be better for you. Or if you like to prepare meals, finding a plan that includes daily menus and recipes may be best. 
· Ask your doctor about other health professionals who can help you achieve your weight loss goals. ¨ A dietitian can help you make healthy changes in your diet. ¨ An exercise specialist or  can help you develop a safe and effective exercise program. 
¨ A counselor or psychiatrist can help you cope with issues such as depression, anxiety, or family problems that can make it hard to focus on weight loss. · Consider joining a support group for people who are trying to lose weight. Your doctor can suggest groups in your area. Where can you learn more? Go to http://tracey-quang.info/. Enter P056 in the search box to learn more about \"Starting a Weight Loss Plan: Care Instructions. \" Current as of: June 26, 2018 Content Version: 11.8 © 1688-2489 [employer]. Care instructions adapted under license by [email or web address] (which disclaims liability or warranty for this information). If you have questions about a medical condition or this instruction, always ask your healthcare professional. [address] any warranty or liability for your use of this information. Introducing [facility] SERVICES! Dear Breana Panchal: Thank you for requesting a [facility] account. Our records indicate that you already have an active [facility] account. You can access your account anytime at [email or web address] Did you know that you can access your hospital and ER discharge instructions at any time in [facility]? You can also review all of your test results from your hospital stay or ER visit. Additional Information If you have questions, please visit the Frequently Asked Questions section of the [facility] website at [email or web address] Remember, [facility] is NOT to be used for urgent needs. For medical emergencies, dial 911. Now available from your iPhone and Android! Please provide this summary of care documentation to your next provider. Your primary care clinician is listed as Beryl Burgess. If you have any questions after today's visit, please call [phone].

## 2018-10-09 NOTE — PROGRESS NOTES
1. Have you been to the ER, urgent care clinic since your last visit? Hospitalized since your last visit? No 
 
2. Have you seen or consulted any other health care providers outside of the [address] [place] [address] since your last visit? Include any pap smears or colon screening. No  
 
Chief Complaint Patient presents with  Follow-up  Breast Problem Lump under both breast, x3 days

## 2018-10-09 NOTE — PROGRESS NOTES
20year old female presents for weight loss medication. Metformin, diet, and exercise has not been successful. Cannot take extended release pills because she has trouble swallowing them and has to crush medications. Thinks phentermine would be a good option for her. Lumps under right and left arms she first noticed Saturday. Right has some tenderness and pain when moving arm. Denies fever or chills.

## 2018-10-09 NOTE — PROGRESS NOTES
Lian Barton is a 20 y.o. female Chief Complaint Patient presents with  Follow-up  Breast Problem Lump under both breast, x3 days  
 pt here for a lump in her R and L armpit and this is painful and she noticed it over the last weekend. No fever no chills. Pt has never had this before. Pt also wants something for weight loss and pt has been undergoing fertility treatments but has stopped this due to not getting pregnant. Pt would like to start a weight loss medication. Pt checked with her insurance and would like to try phentermine and we discussed using saxenda instead and after a productive conversation she would like to start this and states it is covered by insurance. she is a 20y.o. year old female who presents for evalution. Reviewed PmHx, RxHx, FmHx, SocHx, AllgHx and updated and dated in the chart. Review of Systems - negative except as listed above in the HPI Objective:  
 
Vitals:  
 10/09/18 1610 BP: 125/82 Pulse: 82 Resp: 16 Temp: 98.7 °F (37.1 °C) TempSrc: Oral  
SpO2: 99% Weight: 191 lb (86.6 kg) Height: 5' 3\" (1.6 m) Current Outpatient Prescriptions Medication Sig  
 doxycycline (ADOXA) 100 mg tablet Take 1 Tab by mouth two (2) times a day for 10 days.  ondansetron (ZOFRAN ODT) 4 mg disintegrating tablet Take 1 Tab by mouth every eight (8) hours as needed for Nausea.  liraglutide (SAXENDA) 3 mg/0.5 mL (18 mg/3 mL) pen 0.6mg subQ qday x 1 week then 1.2mg daily x 1 week then 1.8mg daily x 1 week then 2.4mg daily x 1 week  Then 3mg sub q daily  naproxen (NAPROSYN) 500 mg tablet TAKE 1 TABLET BY MOUTH TWICE A DAY AS NEEDED WITH MEALS  metFORMIN 500 mg/5 mL soln Take 5 mL by mouth two (2) times daily (with meals).  aspirin delayed-release 81 mg tablet Take  by mouth daily.  mometasone-formoterol (DULERA) 100-5 mcg/actuation HFA inhaler Take 2 Puffs by inhalation two (2) times a day.  Cholecalciferol, Vitamin D3, (VITAMIN D3) 2,000 unit cap capsule Take  by mouth two (2) times a day.  ferrous sulfate (IRON) 325 mg (65 mg iron) EC tablet Take 1 Tab by mouth two (2) times a day.  PROAIR HFA 90 mcg/actuation inhaler  loratadine (CLARITIN) 10 mg tablet Take 10 mg by mouth.  B.infantis-B.ani-B.long-B.bifi (PROBIOTIC 4X) 10-15 mg TbEC Take  by mouth.  norelgestromin-ethinyl estradiol Linda Parish) 150-35 mcg/24 hr 1 Patch by TransDERmal route Every Saturday.  SYMBICORT 160-4.5 mcg/actuation HFA inhaler USE 2 PUFF INHALATION BY MOUTH TWICE DAILY FOR 30 DAY(S) No current facility-administered medications for this visit. Physical Examination: General appearance - alert, well appearing, and in no distress Chest - clear to auscultation, no wheezes, rales or rhonchi, symmetric air entry Heart - normal rate, regular rhythm, normal S1, S2, no murmurs, rubs, clicks or gallops Skin - tender nodule R armpit and L armpit no drainage no fluctuance. Assessment/ Plan:  
Diagnoses and all orders for this visit: 
 
1. Hidradenitis axillaris 
-     doxycycline (ADOXA) 100 mg tablet; Take 1 Tab by mouth two (2) times a day for 10 days. -     ondansetron (ZOFRAN ODT) 4 mg disintegrating tablet; Take 1 Tab by mouth every eight (8) hours as needed for Nausea. Doxy makes her nauseated so will use zofran prn 2. Class 1 obesity due to excess calories without serious comorbidity with body mass index (BMI) of 33.0 to 33.9 in adult 
-     liraglutide (SAXENDA) 3 mg/0.5 mL (18 mg/3 mL) pen; 0.6mg subQ qday x 1 week then 1.2mg daily x 1 week then 1.8mg daily x 1 week then 2.4mg daily x 1 week  Then 3mg sub q daily Follow-up Disposition: 
Return in about 4 weeks (around 11/6/2018), or if symptoms worsen or fail to improve. I have discussed the diagnosis with the patient and the intended plan as seen in the above orders.   The patient has received an after-visit summary and questions were answered concerning future plans. Pt conveyed understanding of plan. Medication Side Effects and Warnings were discussed with patient [address] [facility] [address] DO Discussed the patient's BMI with her. The BMI follow up plan is as follows:  
 
dietary management education, guidance, and counseling 
encourage exercise 
monitor weight 
prescribed dietary intake An After Visit Summary was printed and given to the patient.

## 2018-10-09 NOTE — PATIENT INSTRUCTIONS
Starting a Weight Loss Plan: Care Instructions Your Care Instructions If you are thinking about losing weight, it can be hard to know where to start. Your doctor can help you set up a weight loss plan that best meets your needs. You may want to take a class on nutrition or exercise, or join a weight loss support group. If you have questions about how to make changes to your eating or exercise habits, ask your doctor about seeing a registered dietitian or an exercise specialist. 
It can be a big challenge to lose weight. But you do not have to make huge changes at once. Make small changes, and stick with them. When those changes become habit, add a few more changes. If you do not think you are ready to make changes right now, try to pick a date in the future. Make an appointment to see your doctor to discuss whether the time is right for you to start a plan. Follow-up care is a key part of your treatment and safety. Be sure to make and go to all appointments, and call your doctor if you are having problems. It's also a good idea to know your test results and keep a list of the medicines you take. How can you care for yourself at home? · Set realistic goals. Many people expect to lose much more weight than is likely. A weight loss of 5% to 10% of your body weight may be enough to improve your health. · Get family and friends involved to provide support. Talk to them about why you are trying to lose weight, and ask them to help. They can help by participating in exercise and having meals with you, even if they may be eating something different. · Find what works best for you. If you do not have time or do not like to cook, a program that offers meal replacement bars or shakes may be better for you. Or if you like to prepare meals, finding a plan that includes daily menus and recipes may be best. 
· Ask your doctor about other health professionals who can help you achieve your weight loss goals. ¨ A dietitian can help you make healthy changes in your diet. ¨ An exercise specialist or  can help you develop a safe and effective exercise program. 
¨ A counselor or psychiatrist can help you cope with issues such as depression, anxiety, or family problems that can make it hard to focus on weight loss. · Consider joining a support group for people who are trying to lose weight. Your doctor can suggest groups in your area. Where can you learn more? Go to http://tracey-quang.info/. Enter P776 in the search box to learn more about \"Starting a Weight Loss Plan: Care Instructions. \" Current as of: June 26, 2018 Content Version: 11.8 © 6030-7469 [employer]. Care instructions adapted under license by [employer] (which disclaims liability or warranty for this information). If you have questions about a medical condition or this instruction, always ask your healthcare professional. [address] any warranty or liability for your use of this information. Body Mass Index: Care Instructions Your Care Instructions Body mass index (BMI) can help you see if your weight is raising your risk for health problems. It uses a formula to compare how much you weigh with how tall you are. · A BMI lower than 18.5 is considered underweight. · A BMI between 18.5 and 24.9 is considered healthy. · A BMI between 25 and 29.9 is considered overweight. A BMI of 30 or higher is considered obese. If your BMI is in the normal range, it means that you have a lower risk for weight-related health problems. If your BMI is in the overweight or obese range, you may be at increased risk for weight-related health problems, such as high blood pressure, heart disease, stroke, arthritis or joint pain, and diabetes.  If your BMI is in the underweight range, you may be at increased risk for health problems such as fatigue, lower protection (immunity) against illness, muscle loss, bone loss, hair loss, and hormone problems. BMI is just one measure of your risk for weight-related health problems. You may be at higher risk for health problems if you are not active, you eat an unhealthy diet, or you drink too much alcohol or use tobacco products. Follow-up care is a key part of your treatment and safety. Be sure to make and go to all appointments, and call your doctor if you are having problems. It's also a good idea to know your test results and keep a list of the medicines you take. How can you care for yourself at home? · Practice healthy eating habits. This includes eating plenty of fruits, vegetables, whole grains, lean protein, and low-fat dairy. · If your doctor recommends it, get more exercise. Walking is a good choice. Bit by bit, increase the amount you walk every day. Try for at least 30 minutes on most days of the week. · Do not smoke. Smoking can increase your risk for health problems. If you need help quitting, talk to your doctor about stop-smoking programs and medicines. These can increase your chances of quitting for good. · Limit alcohol to 2 drinks a day for men and 1 drink a day for women. Too much alcohol can cause health problems. If you have a BMI higher than 25 · Your doctor may do other tests to check your risk for weight-related health problems. This may include measuring the distance around your waist. A waist measurement of more than 40 inches in men or 35 inches in women can increase the risk of weight-related health problems. · Talk with your doctor about steps you can take to stay healthy or improve your health. You may need to make lifestyle changes to lose weight and stay healthy, such as changing your diet and getting regular exercise. If you have a BMI lower than 18.5 · Your doctor may do other tests to check your risk for health problems.  
· Talk with your doctor about steps you can take to stay healthy or improve your health. You may need to make lifestyle changes to gain or maintain weight and stay healthy, such as getting more healthy foods in your diet and doing exercises to build muscle. Where can you learn more? Go to http://tracey-quang.info/. Enter S176 in the search box to learn more about \"Body Mass Index: Care Instructions. \" Current as of: October 13, 2016 Content Version: 11.4 © 9346-7740 [employer]. Care instructions adapted under license by [employer] (which disclaims liability or warranty for this information). If you have questions about a medical condition or this instruction, always ask your healthcare professional. [address] any warranty or liability for your use of this information.

## 2018-10-12 ENCOUNTER — TELEPHONE (OUTPATIENT)
Dept: FAMILY MEDICINE CLINIC | Age: 23
End: 2018-10-12

## 2018-10-12 NOTE — TELEPHONE ENCOUNTER
Daniel submitted to [address] [place] [address] ([id number]) via Cover my meds. Awaiting reponse.    Key: [id number]

## 2018-10-20 LAB
BASOPHILS # BLD AUTO: 0.1 X10E3/UL (ref 0–0.2)
BASOPHILS NFR BLD AUTO: 0 %
EOSINOPHIL # BLD AUTO: 0 X10E3/UL (ref 0–0.4)
EOSINOPHIL NFR BLD AUTO: 0 %
ERYTHROCYTE [DISTWIDTH] IN BLOOD BY AUTOMATED COUNT: 12.6 % (ref 12.3–15.4)
HCT VFR BLD AUTO: 35.4 % (ref 34–46.6)
HGB BLD-MCNC: 11.5 G/DL (ref 11.1–15.9)
IMM GRANULOCYTES # BLD: 0 X10E3/UL (ref 0–0.1)
IMM GRANULOCYTES NFR BLD: 0 %
LYMPHOCYTES # BLD AUTO: 3.4 X10E3/UL (ref 0.7–3.1)
LYMPHOCYTES NFR BLD AUTO: 26 %
MCH RBC QN AUTO: 27.6 PG (ref 26.6–33)
MCHC RBC AUTO-ENTMCNC: 32.5 G/DL (ref 31.5–35.7)
MCV RBC AUTO: 85 FL (ref 79–97)
MONOCYTES # BLD AUTO: 0.8 X10E3/UL (ref 0.1–0.9)
MONOCYTES NFR BLD AUTO: 6 %
NEUTROPHILS # BLD AUTO: 8.8 X10E3/UL (ref 1.4–7)
NEUTROPHILS NFR BLD AUTO: 68 %
PLATELET # BLD AUTO: 471 X10E3/UL (ref 150–379)
RBC # BLD AUTO: 4.17 X10E6/UL (ref 3.77–5.28)
WBC # BLD AUTO: 13.1 X10E3/UL (ref 3.4–10.8)

## 2018-10-24 ENCOUNTER — DOCUMENTATION ONLY (OUTPATIENT)
Dept: FAMILY MEDICINE CLINIC | Age: 23
End: 2018-10-24

## 2018-10-24 NOTE — PROGRESS NOTES
Daniel submitted to [facility] via fax form. Awaiting reponse. Form placed on 's desk for signature then fax.

## 2018-10-25 ENCOUNTER — OFFICE VISIT (OUTPATIENT)
Dept: ONCOLOGY | Age: 23
End: 2018-10-25

## 2018-10-25 ENCOUNTER — DOCUMENTATION ONLY (OUTPATIENT)
Dept: FAMILY MEDICINE CLINIC | Age: 23
End: 2018-10-25

## 2018-10-25 VITALS
DIASTOLIC BLOOD PRESSURE: 79 MMHG | HEIGHT: 63 IN | WEIGHT: 187 LBS | OXYGEN SATURATION: 96 % | HEART RATE: 90 BPM | TEMPERATURE: 97.6 F | RESPIRATION RATE: 16 BRPM | SYSTOLIC BLOOD PRESSURE: 121 MMHG | BODY MASS INDEX: 33.13 KG/M2

## 2018-10-25 DIAGNOSIS — D75.839 THROMBOCYTOSIS: Primary | ICD-10-CM

## 2018-10-25 DIAGNOSIS — D72.9 NEUTROPHILIA: ICD-10-CM

## 2018-10-25 RX ORDER — ASCORBIC ACID 250 MG
TABLET ORAL
COMMUNITY

## 2018-10-25 NOTE — PROGRESS NOTES
Faxed PA form for [employer] [facility]. Fax number [phone] confirmation number [id number].

## 2018-10-25 NOTE — PROGRESS NOTES
[facility] at [address], [address] [facility] [address] [facility]  W: [phone]  F: [phone]      Reason for Visit:   Osman Varma is a 20 y.o. female who is seen for follow up of thrombocytosis, leukocytosis. History of Present Illness:   She feels well. Some allergy issues. Recently some hiadrenitis managed by her PCP, doing much better now. Took some abx for this with improvement. No other infectious issues. No fevers, chills, night sweats, unintentional weight loss, adenopathy. Chronic fatigue persists, stable. Not taking iron. She is unaccompanied today. She works for a pain specialist.    PAST HISTORY: The following sections were reviewed and updated in the EMR as appropriate: PMH, SH, FH, Medications, Allergies. Allergies   Allergen Reactions    Latex Hives    Pineapple Hives      Review of Systems: A complete review of systems was obtained, reviewed, and scanned into the EMR. Pertinent findings reviewed above. Physical Exam:     Visit Vitals  /79 (BP 1 Location: Left arm, BP Patient Position: Sitting)   Pulse 90   Temp 97.6 °F (36.4 °C) (Oral)   Resp 16   Ht 5' 3\" (1.6 m)   Wt 187 lb (84.8 kg)   SpO2 96%   BMI 33.13 kg/m²     General: No distress  Eyes: PERRLA, anicteric sclerae  HENT: Atraumatic, OP clear  Neck: Supple  Lymphatic: No cervical, supraclavicular, or inguinal adenopathy  Respiratory: CTAB, normal respiratory effort  CV: Normal rate, regular rhythm, no murmurs, no peripheral edema  GI: Soft, nontender, nondistended, no masses, no hepatomegaly, no splenomegaly  MS: Normal gait and station. Digits without clubbing or cyanosis. Skin: No rashes, ecchymoses, or petechiae. Normal temperature, turgor, and texture.   Psych: Alert, oriented, appropriate affect, normal judgment/insight    Results:     Lab Results   Component Value Date/Time    WBC 13.1 (H) 10/19/2018 04:36 PM    HGB 11.5 10/19/2018 04:36 PM    HCT 35.4 10/19/2018 04:36 PM    PLATELET 510 (H) 54/96/3465 04:36 PM    MCV 85 10/19/2018 04:36 PM    ABS. NEUTROPHILS 8.8 (H) 10/19/2018 04:36 PM     Lab Results   Component Value Date/Time    Sodium 141 01/19/2018 08:22 AM    Potassium 4.0 01/19/2018 08:22 AM    Chloride 96 01/19/2018 08:22 AM    CO2 27 01/19/2018 08:22 AM    Glucose 81 01/19/2018 08:22 AM    BUN 13 01/19/2018 08:22 AM    Creatinine 0.75 01/19/2018 08:22 AM    GFR est  01/19/2018 08:22 AM    GFR est non- 01/19/2018 08:22 AM    Calcium 9.9 01/19/2018 08:22 AM     Lab Results   Component Value Date/Time    Bilirubin, total 0.3 01/19/2018 08:22 AM    ALT (SGPT) 14 01/19/2018 08:22 AM    AST (SGOT) 17 01/19/2018 08:22 AM    Alk. phosphatase 74 01/19/2018 08:22 AM    Protein, total 8.1 01/19/2018 08:22 AM    Albumin 4.8 01/19/2018 08:22 AM     Lab Results   Component Value Date/Time    Iron % saturation 12 (L) 04/11/2018 01:25 PM    TIBC 304 04/11/2018 01:25 PM    Ferritin 94 04/11/2018 01:25 PM     08/09/2017 09:06 AM    Sed rate (ESR) 22 11/01/2017 04:19 PM    Sed rate, automated 45 (H) 08/09/2017 09:06 AM    C-Reactive protein 1.91 (H) 08/09/2017 09:06 AM    TSH 2.520 01/19/2018 08:22 AM     PLATELET   Date Value   10/19/2018 471 x10E3/uL (H)   04/11/2018 428 x10E3/uL (H)   11/01/2017 468 x10E3/uL (H)   08/09/2017 414 K/uL (H)   07/19/2017 485 x10E3/uL (H)   03/21/2017 405 x10E3/uL (H)   01/24/2017 437 x10E3/uL (H)     WBC   Date Value   10/19/2018 13.1 x10E3/uL (H)   04/11/2018 12.6 x10E3/uL (H)   11/01/2017 11.6 x10E3/uL (H)   08/09/2017 10.2 K/uL   07/19/2017 10.6 x10E3/uL   03/21/2017 12.3 x10E3/uL (H)   01/24/2017 12.3 x10E3/uL (H)       Smear 8/9/2017: Normochromic normocytic RBC's. Reactive lymphocytes and rare basophil. Thrombocytosis.      4/11/2018  JAK2 V617F negative  JAK2 exons 12-15 negative  CALR: negative    Left LE Doppler 8/27/2018: negative    Assessment:   1) Thrombocytosis  Mild, with platelets ranging from 405 to 485 since 1/2017. Persists despite trial of oral iron and improvement in ferritin. I suspect she may have an MPN such as ET, though JAK2 and CALR testing were negative. If she does have ET, she would be low risk based on age, lack of VTE, and negative JAK2. No treatment would be indicated at this time. Given the stability, we have opted to hold off on bone marrow biopsy and monitor for now. Continue ASA 81mg PO daily. 2) Neutrophilia  Intermittent. Additional labwork unremarkable. Possibly from an MPN as above. Monitor. 3) Menorrhagia  No more menses in several months, has PCOS.     Plan:     · Labs in 6-12 months: CBC, ESR, CRP, ferritin ([facility])  · Return to clinic in 6-12 months      Signed By: Olden Fleischer, MD

## 2018-10-29 NOTE — PROGRESS NOTES
Per [facility] for Vivien Hair was denied. Per plan med is excluded from coverage and override is not allowed. Ref# Not given    Patient id x [phone], notified of med denial and verbalized understanding. Asked that  be notified. Pt states that the only other med she can be on is phentermine.

## 2018-10-30 DIAGNOSIS — E66.9 OBESITY (BMI 30-39.9): Primary | ICD-10-CM

## 2018-10-30 DIAGNOSIS — E66.09 CLASS 1 OBESITY DUE TO EXCESS CALORIES WITHOUT SERIOUS COMORBIDITY WITH BODY MASS INDEX (BMI) OF 33.0 TO 33.9 IN ADULT: Primary | ICD-10-CM

## 2018-10-30 RX ORDER — PHENTERMINE HYDROCHLORIDE 15 MG/1
15 CAPSULE ORAL
Qty: 30 CAP | Refills: 0 | Status: SHIPPED | OUTPATIENT
Start: 2018-10-30 | End: 2018-10-30

## 2018-10-30 NOTE — PROGRESS NOTES
Will start phentermine, pt not actively trying to conceive.   She may  Rx starting 15mg daily and will need 30 day follow up for weight check

## 2018-10-30 NOTE — PROGRESS NOTES
Called and spoke with patient letting her know RX for phentermine has been written and is placed at the  for p/u.

## 2018-10-31 DIAGNOSIS — E66.09 CLASS 1 OBESITY DUE TO EXCESS CALORIES WITHOUT SERIOUS COMORBIDITY WITH BODY MASS INDEX (BMI) OF 33.0 TO 33.9 IN ADULT: Primary | ICD-10-CM

## 2018-10-31 RX ORDER — PHENTERMINE HYDROCHLORIDE 37.5 MG/1
TABLET ORAL
Qty: 15 TAB | Refills: 0 | Status: SHIPPED | OUTPATIENT
Start: 2018-10-31

## 2018-11-01 ENCOUNTER — TELEPHONE (OUTPATIENT)
Dept: FAMILY MEDICINE CLINIC | Age: 23
End: 2018-11-01

## 2018-11-01 NOTE — TELEPHONE ENCOUNTER
Jaquelin Mooney calling from [employer] and she needs to speak with the nurse about medication Saxenda. Please call her back at [phone] ref # [id number]. She needs to know if still taking this or something else.

## 2018-12-19 RX ORDER — NAPROXEN 500 MG/1
TABLET ORAL
Qty: 60 TAB | Refills: 0 | Status: SHIPPED | OUTPATIENT
Start: 2018-12-19

## 2019-02-07 ENCOUNTER — OFFICE VISIT (OUTPATIENT)
Dept: FAMILY MEDICINE CLINIC | Age: 24
End: 2019-02-07

## 2019-02-07 VITALS
DIASTOLIC BLOOD PRESSURE: 76 MMHG | TEMPERATURE: 98.2 F | HEART RATE: 99 BPM | OXYGEN SATURATION: 98 % | SYSTOLIC BLOOD PRESSURE: 112 MMHG | HEIGHT: 63 IN | RESPIRATION RATE: 16 BRPM | BODY MASS INDEX: 30.12 KG/M2 | WEIGHT: 170 LBS

## 2019-02-07 DIAGNOSIS — F41.9 ANXIETY: ICD-10-CM

## 2019-02-07 DIAGNOSIS — Z00.00 PHYSICAL EXAM: Primary | ICD-10-CM

## 2019-02-07 RX ORDER — SERTRALINE HYDROCHLORIDE 25 MG/1
25 TABLET, FILM COATED ORAL DAILY
Qty: 30 TAB | Refills: 1 | Status: SHIPPED | OUTPATIENT
Start: 2019-02-07 | End: 2019-03-05 | Stop reason: SDUPTHER

## 2019-02-07 NOTE — PATIENT INSTRUCTIONS

## 2019-02-07 NOTE — PROGRESS NOTES
Fidel Robin is a 20 y.o. female Chief Complaint Patient presents with  Complete Physical  
 Labs  Anxiety  
 pt here for CPE and states she has been having increasing anxiety. Pt states she made an appt with an LCSW for next week, pt states she has been seeing this person on and off and was prev seeing for anger. 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 Pt states she averages 4 hours of sleep a day. She is not eating well due to schedule. Chief Complaint Patient presents with  Complete Physical  
 Labs  Anxiety  
 
she is a 20y.o. year old female who presents for evalution. Reviewed PmHx, RxHx, FmHx, SocHx, AllgHx and updated and dated in the chart. Review of Systems - negative except as listed above in the HPI Objective:  
 
Vitals:  
 02/07/19 1121 BP: 112/76 Pulse: 99 Resp: 16 Temp: 98.2 °F (36.8 °C) TempSrc: Oral  
SpO2: 98% Weight: 170 lb (77.1 kg) Height: 5' 3\" (1.6 m) Physical Examination: General appearance - alert, well appearing, and in no distress Mental status - alert, oriented to person, place, and time Eyes - pupils equal and reactive, extraocular eye movements intact Ears - bilateral TM's and external ear canals normal 
Mouth - mucous membranes moist, pharynx normal without lesions Neck - supple, no significant adenopathy Lymphatics - no palpable lymphadenopathy, no hepatosplenomegaly Chest - clear to auscultation, no wheezes, rales or rhonchi, symmetric air entry Heart - normal rate, regular rhythm, normal S1, S2, no murmurs, rubs, clicks or gallops Abdomen - soft, nontender, nondistended, no masses or organomegaly Back exam - full range of motion, no tenderness, palpable spasm or pain on motion Neurological - alert, oriented, normal speech, no focal findings or movement disorder noted Musculoskeletal - no joint tenderness, deformity or swelling Extremities - peripheral pulses normal, no pedal edema, no clubbing or cyanosis Assessment/ Plan:  
Diagnoses and all orders for this visit: 1. Physical exam 
-     CBC WITH AUTOMATED DIFF 
-     METABOLIC PANEL, COMPREHENSIVE 
-     TSH 3RD GENERATION 
-     LIPID PANEL 2. Anxiety 
-     sertraline (ZOLOFT) 25 mg tablet; Take 1 Tab by mouth daily. 
 
  
-Patient is in good health 
-Discussed with patient cancer risk factors and screens needed 
-Patient needs a colonoscopy no 
-Labs from previous visits were discussed with patient yes 
-Discussed with patient diet and exercise=yes 
-Discussed with patient testicular (male)/breast self exam (female)= yes Follow-up Disposition: 
Return in about 4 weeks (around 3/7/2019), or if symptoms worsen or fail to improve. I have discussed the diagnosis with the patient and the intended plan as seen in the above orders. The patient has received an after-visit summary and questions were answered concerning future plans. Pt conveyed understanding. Medication Side Effects and Warnings were discussed with patient: yes Patient Labs were reviewed and or requested: yes Patient Past Records were reviewed and or requested  yes Patient Instructions Anxiety Disorder: Care Instructions Your Care Instructions Anxiety is a normal reaction to stress. Difficult situations can cause you to have symptoms such as sweaty palms and a nervous feeling. In an anxiety disorder, the symptoms are far more severe. Constant worry, muscle tension, trouble sleeping, nausea and diarrhea, and other symptoms can make normal daily activities difficult or impossible. These symptoms may occur for no reason, and they can affect your work, school, or social life. Medicines, counseling, and self-care can all help. Follow-up care is a key part of your treatment and safety.  Be sure to make and go to all appointments, and call your doctor if you are having problems. It's also a good idea to know your test results and keep a list of the medicines you take. How can you care for yourself at home? · Take medicines exactly as directed. Call your doctor if you think you are having a problem with your medicine. · Go to your counseling sessions and follow-up appointments. · Recognize and accept your anxiety. Then, when you are in a situation that makes you anxious, say to yourself, \"This is not an emergency. I feel uncomfortable, but I am not in danger. I can keep going even if I feel anxious. \" · Be kind to your body: 
? Relieve tension with exercise or a massage. ? Get enough rest. 
? Avoid alcohol, caffeine, nicotine, and illegal drugs. They can increase your anxiety level and cause sleep problems. ? Learn and do relaxation techniques. See below for more about these techniques. · Engage your mind. Get out and do something you enjoy. Go to a funny movie, or take a walk or hike. Plan your day. Having too much or too little to do can make you anxious. · Keep a record of your symptoms. Discuss your fears with a good friend or family member, or join a support group for people with similar problems. Talking to others sometimes relieves stress. · Get involved in social groups, or volunteer to help others. Being alone sometimes makes things seem worse than they are. · Get at least 30 minutes of exercise on most days of the week to relieve stress. Walking is a good choice. You also may want to do other activities, such as running, swimming, cycling, or playing tennis or team sports. Relaxation techniques Do relaxation exercises 10 to 20 minutes a day. You can play soothing, relaxing music while you do them, if you wish. · Tell others in your house that you are going to do your relaxation exercises. Ask them not to disturb you. · Find a comfortable place, away from all distractions and noise. · Lie down on your back, or sit with your back straight. · Focus on your breathing. Make it slow and steady. · Breathe in through your nose. Breathe out through either your nose or mouth. · Breathe deeply, filling up the area between your navel and your rib cage. Breathe so that your belly goes up and down. · Do not hold your breath. · Breathe like this for 5 to 10 minutes. Notice the feeling of calmness throughout your whole body. As you continue to breathe slowly and deeply, relax by doing the following for another 5 to 10 minutes: · Tighten and relax each muscle group in your body. You can begin at your toes and work your way up to your head. · Imagine your muscle groups relaxing and becoming heavy. · Empty your mind of all thoughts. · Let yourself relax more and more deeply. · Become aware of the state of calmness that surrounds you. · When your relaxation time is over, you can bring yourself back to alertness by moving your fingers and toes and then your hands and feet and then stretching and moving your entire body. Sometimes people fall asleep during relaxation, but they usually wake up shortly afterward. · Always give yourself time to return to full alertness before you drive a car or do anything that might cause an accident if you are not fully alert. Never play a relaxation tape while you drive a car. When should you call for help? Call 911 anytime you think you may need emergency care. For example, call if: 
  · You feel you cannot stop from hurting yourself or someone else.  
Shashank Kapadia the numbers for these national suicide hotlines: 1-815-339-TALK (7-202.993.6976) and 1-334-QNNEJYL (1-863.401.8236). If you or someone you know talks about suicide or feeling hopeless, get help right away. 
 Watch closely for changes in your health, and be sure to contact your doctor if: 
  · You have anxiety or fear that affects your life.  
  · You have symptoms of anxiety that are new or different from those you had before. Where can you learn more? Go to http://tracey-quang.info/. Enter P754 in the search box to learn more about \"Anxiety Disorder: Care Instructions. \" Current as of: September 11, 2018 Content Version: 11.9 © 3630-0277 [employer]. Care instructions adapted under license by [employer] (which disclaims liability or warranty for this information). If you have questions about a medical condition or this instruction, always ask your healthcare professional. [place] [address] any warranty or liability for your use of this information. Dr. Frank Berrios

## 2019-02-07 NOTE — PROGRESS NOTES
Chief Complaint Patient presents with  Complete Physical  
 Labs  Anxiety Patient presents in office today for CPE with fasting labs. Has c/o increase in anxiety over the last couple weeks. States that its so bad she sits in the shower and starts screaming and crying. And states that at times she feels like driving into oncoming traffic. States that she has an apt with a counselor for this coming Monday. No other concerns. 1. Have you been to the ER, urgent care clinic since your last visit? Hospitalized since your last visit? No 
 
2. Have you seen or consulted any other health care providers outside of the [facility] since your last visit? Include any pap smears or colon screening. 1/2019 with pulmonary. Dr. Niels Kussmaul. Learning Assessment 1/24/2017 PRIMARY LEARNER Patient HIGHEST LEVEL OF EDUCATION - PRIMARY LEARNER  SOME COLLEGE  
BARRIERS PRIMARY LEARNER NONE  
CO-LEARNER CAREGIVER No  
PRIMARY LANGUAGE ENGLISH  
LEARNER PREFERENCE PRIMARY DEMONSTRATION  
ANSWERED BY Patient RELATIONSHIP SELF

## 2019-02-08 LAB
ALBUMIN SERPL-MCNC: 4.5 G/DL (ref 3.5–5.5)
ALBUMIN/GLOB SERPL: 1.5 {RATIO} (ref 1.2–2.2)
ALP SERPL-CCNC: 84 IU/L (ref 39–117)
ALT SERPL-CCNC: 9 IU/L (ref 0–32)
AST SERPL-CCNC: 14 IU/L (ref 0–40)
BASOPHILS # BLD AUTO: 0 X10E3/UL (ref 0–0.2)
BASOPHILS NFR BLD AUTO: 0 %
BILIRUB SERPL-MCNC: 0.4 MG/DL (ref 0–1.2)
BUN SERPL-MCNC: 7 MG/DL (ref 6–20)
BUN/CREAT SERPL: 9 (ref 9–23)
CALCIUM SERPL-MCNC: 9.2 MG/DL (ref 8.7–10.2)
CHLORIDE SERPL-SCNC: 103 MMOL/L (ref 96–106)
CHOLEST SERPL-MCNC: 170 MG/DL (ref 100–199)
CO2 SERPL-SCNC: 24 MMOL/L (ref 20–29)
CREAT SERPL-MCNC: 0.75 MG/DL (ref 0.57–1)
EOSINOPHIL # BLD AUTO: 0 X10E3/UL (ref 0–0.4)
EOSINOPHIL NFR BLD AUTO: 0 %
ERYTHROCYTE [DISTWIDTH] IN BLOOD BY AUTOMATED COUNT: 13.2 % (ref 12.3–15.4)
GLOBULIN SER CALC-MCNC: 3.1 G/DL (ref 1.5–4.5)
GLUCOSE SERPL-MCNC: 73 MG/DL (ref 65–99)
HCT VFR BLD AUTO: 34.3 % (ref 34–46.6)
HDLC SERPL-MCNC: 36 MG/DL
HGB BLD-MCNC: 11 G/DL (ref 11.1–15.9)
IMM GRANULOCYTES # BLD AUTO: 0 X10E3/UL (ref 0–0.1)
IMM GRANULOCYTES NFR BLD AUTO: 0 %
INTERPRETATION, 910389: NORMAL
LDLC SERPL CALC-MCNC: 119 MG/DL (ref 0–99)
LYMPHOCYTES # BLD AUTO: 2.1 X10E3/UL (ref 0.7–3.1)
LYMPHOCYTES NFR BLD AUTO: 16 %
MCH RBC QN AUTO: 27.3 PG (ref 26.6–33)
MCHC RBC AUTO-ENTMCNC: 32.1 G/DL (ref 31.5–35.7)
MCV RBC AUTO: 85 FL (ref 79–97)
MONOCYTES # BLD AUTO: 0.9 X10E3/UL (ref 0.1–0.9)
MONOCYTES NFR BLD AUTO: 7 %
NEUTROPHILS # BLD AUTO: 10.6 X10E3/UL (ref 1.4–7)
NEUTROPHILS NFR BLD AUTO: 77 %
PLATELET # BLD AUTO: 455 X10E3/UL (ref 150–379)
POTASSIUM SERPL-SCNC: 4.2 MMOL/L (ref 3.5–5.2)
PROT SERPL-MCNC: 7.6 G/DL (ref 6–8.5)
RBC # BLD AUTO: 4.03 X10E6/UL (ref 3.77–5.28)
SODIUM SERPL-SCNC: 142 MMOL/L (ref 134–144)
TRIGL SERPL-MCNC: 75 MG/DL (ref 0–149)
TSH SERPL DL<=0.005 MIU/L-ACNC: 0.48 UIU/ML (ref 0.45–4.5)
VLDLC SERPL CALC-MCNC: 15 MG/DL (ref 5–40)
WBC # BLD AUTO: 13.7 X10E3/UL (ref 3.4–10.8)

## 2019-02-08 NOTE — PROGRESS NOTES
Your WBC and neutrophils have both gone up, I'd like to repeat in one month. Please schedule a f/u appt. Otherwise, your LDL or bad cholesterol is a little high and your HDL or good cholesterol is a little low. Increase exercise to help with both along with healthy eating habits.

## 2019-03-05 ENCOUNTER — OFFICE VISIT (OUTPATIENT)
Dept: FAMILY MEDICINE CLINIC | Age: 24
End: 2019-03-05

## 2019-03-05 VITALS
RESPIRATION RATE: 16 BRPM | WEIGHT: 170 LBS | DIASTOLIC BLOOD PRESSURE: 70 MMHG | HEART RATE: 86 BPM | TEMPERATURE: 97.8 F | SYSTOLIC BLOOD PRESSURE: 105 MMHG | BODY MASS INDEX: 30.12 KG/M2 | HEIGHT: 63 IN | OXYGEN SATURATION: 98 %

## 2019-03-05 DIAGNOSIS — D72.829 LEUKOCYTOSIS, UNSPECIFIED TYPE: Primary | ICD-10-CM

## 2019-03-05 DIAGNOSIS — F41.9 ANXIETY: ICD-10-CM

## 2019-03-05 RX ORDER — SERTRALINE HYDROCHLORIDE 50 MG/1
50 TABLET, FILM COATED ORAL DAILY
Qty: 30 TAB | Refills: 5 | Status: SHIPPED | OUTPATIENT
Start: 2019-03-05

## 2019-03-05 NOTE — PROGRESS NOTES
Idalmis Monique is a 20 y.o. female   Chief Complaint   Patient presents with    Follow-up    Labs    Medication Evaluation    pt here for follow up on her zoloft and states that this is helping her. Pt states she has a lot of fatigue and does not get a lot of rest.  Pt is still seeing LCSW as well. Pt would like to increase dose. Pt also with elevated WBC and has seen Dr Izabela Cardoso for this and is due for follow up today. Last check was elevated some compared to prior. she is a 20y.o. year old female who presents for evalution. Reviewed PmHx, RxHx, FmHx, SocHx, AllgHx and updated and dated in the chart. Review of Systems - negative except as listed above in the HPI    Objective:     Vitals:    03/05/19 1355   BP: 105/70   Pulse: 86   Resp: 16   Temp: 97.8 °F (36.6 °C)   TempSrc: Oral   SpO2: 98%   Weight: 170 lb (77.1 kg)   Height: 5' 3\" (1.6 m)       Current Outpatient Medications   Medication Sig    sertraline (ZOLOFT) 50 mg tablet Take 1 Tab by mouth daily.  naproxen (NAPROSYN) 500 mg tablet TAKE 1 TABLET BY MOUTH TWICE A DAY AS NEEDED WITH MEALS    ascorbic acid, vitamin C, (VITAMIN C) 250 mg tablet Take  by mouth.  ondansetron (ZOFRAN ODT) 4 mg disintegrating tablet Take 1 Tab by mouth every eight (8) hours as needed for Nausea.  metFORMIN 500 mg/5 mL soln Take 5 mL by mouth two (2) times daily (with meals).  aspirin delayed-release 81 mg tablet Take  by mouth daily.  PROAIR HFA 90 mcg/actuation inhaler     loratadine (CLARITIN) 10 mg tablet Take 10 mg by mouth.  B.infantis-B.ani-B.long-B.bifi (PROBIOTIC 4X) 10-15 mg TbEC Take  by mouth.  SYMBICORT 160-4.5 mcg/actuation HFA inhaler USE 2 PUFF INHALATION BY MOUTH TWICE DAILY FOR 30 DAY(S)    phentermine (ADIPEX-P) 37.5 mg tablet 1/2 tab Po Qam    Cholecalciferol, Vitamin D3, (VITAMIN D3) 2,000 unit cap capsule Take  by mouth two (2) times a day. No current facility-administered medications for this visit. Physical Examination: General appearance - alert, well appearing, and in no distress  Chest - clear to auscultation, no wheezes, rales or rhonchi, symmetric air entry  Heart - normal rate, regular rhythm, normal S1, S2, no murmurs, rubs, clicks or gallops      Assessment/ Plan:   Diagnoses and all orders for this visit:    1. Leukocytosis, unspecified type  -     CBC WITH AUTOMATED DIFF    2. Anxiety  -     sertraline (ZOLOFT) 50 mg tablet; Take 1 Tab by mouth daily. Follow-up Disposition:  Return if symptoms worsen or fail to improve. I have discussed the diagnosis with the patient and the intended plan as seen in the above orders. The patient has received an after-visit summary and questions were answered concerning future plans. Pt conveyed understanding of plan.     Medication Side Effects and Warnings were discussed with patient      Fabiola Wright,

## 2019-03-05 NOTE — PATIENT INSTRUCTIONS
A Healthy Lifestyle: Care Instructions  Your Care Instructions    A healthy lifestyle can help you feel good, stay at a healthy weight, and have plenty of energy for both work and play. A healthy lifestyle is something you can share with your whole family. A healthy lifestyle also can lower your risk for serious health problems, such as high blood pressure, heart disease, and diabetes. You can follow a few steps listed below to improve your health and the health of your family. Follow-up care is a key part of your treatment and safety. Be sure to make and go to all appointments, and call your doctor if you are having problems. It's also a good idea to know your test results and keep a list of the medicines you take. How can you care for yourself at home? · Do not eat too much sugar, fat, or fast foods. You can still have dessert and treats now and then. The goal is moderation. · Start small to improve your eating habits. Pay attention to portion sizes, drink less juice and soda pop, and eat more fruits and vegetables. ? Eat a healthy amount of food. A 3-ounce serving of meat, for example, is about the size of a deck of cards. Fill the rest of your plate with vegetables and whole grains. ? Limit the amount of soda and sports drinks you have every day. Drink more water when you are thirsty. ? Eat at least 5 servings of fruits and vegetables every day. It may seem like a lot, but it is not hard to reach this goal. A serving or helping is 1 piece of fruit, 1 cup of vegetables, or 2 cups of leafy, raw vegetables. Have an apple or some carrot sticks as an afternoon snack instead of a candy bar. Try to have fruits and/or vegetables at every meal.  · Make exercise part of your daily routine. You may want to start with simple activities, such as walking, bicycling, or slow swimming. Try to be active 30 to 60 minutes every day. You do not need to do all 30 to 60 minutes all at once.  For example, you can exercise 3 times a day for 10 or 20 minutes. Moderate exercise is safe for most people, but it is always a good idea to talk to your doctor before starting an exercise program.  · Keep moving. [place] [employer] the lawn, work in the garden, or [employer]. Take the stairs instead of the elevator at work. · If you smoke, quit. People who smoke have an increased risk for heart attack, stroke, cancer, and other lung illnesses. Quitting is hard, but there are ways to boost your chance of quitting tobacco for good. ? Use nicotine gum, patches, or lozenges. ? Ask your doctor about stop-smoking programs and medicines. ? Keep trying. In addition to reducing your risk of diseases in the future, you will notice some benefits soon after you stop using tobacco. If you have shortness of breath or asthma symptoms, they will likely get better within a few weeks after you quit. · Limit how much alcohol you drink. Moderate amounts of alcohol (up to 2 drinks a day for men, 1 drink a day for women) are okay. But drinking too much can lead to liver problems, high blood pressure, and other health problems. Family health  If you have a family, there are many things you can do together to improve your health. · Eat meals together as a family as often as possible. · Eat healthy foods. This includes fruits, vegetables, lean meats and dairy, and whole grains. · Include your family in your fitness plan. Most people think of activities such as jogging or tennis as the way to fitness, but there are many ways you and your family can be more active. Anything that makes you breathe hard and gets your heart pumping is exercise. Here are some tips:  ? Walk to do errands or to take your child to school or the bus.  ? Go for a family bike ride after dinner instead of watching TV. Where can you learn more? Go to http://tracey-quang.info/. Enter I432 in the search box to learn more about \"A Healthy Lifestyle: Care Instructions. \"  Current as of: September 11, 2018  Content Version: 11.9  © 1265-9415 [employer]. Care instructions adapted under license by [employer] (which disclaims liability or warranty for this information). If you have questions about a medical condition or this instruction, always ask your healthcare professional. [address] any warranty or liability for your use of this information.

## 2019-03-05 NOTE — PROGRESS NOTES
Chief Complaint   Patient presents with    Follow-up    Labs    Medication Evaluation     Patient presents in office today for f/u to labs. Also her to review medication. No concerns. 1. Have you been to the ER, urgent care clinic since your last visit? Hospitalized since your last visit? No    2. Have you seen or consulted any other health care providers outside of the [address] [place] [address] since your last visit? Include any pap smears or colon screening.  No    Learning Assessment 1/24/2017   PRIMARY LEARNER Patient   HIGHEST LEVEL OF EDUCATION - PRIMARY LEARNER  SOME COLLEGE   BARRIERS PRIMARY LEARNER NONE   CO-LEARNER CAREGIVER No   PRIMARY LANGUAGE ENGLISH   LEARNER PREFERENCE PRIMARY DEMONSTRATION   ANSWERED BY Patient   RELATIONSHIP SELF

## 2019-03-06 LAB
BASOPHILS # BLD AUTO: 0 X10E3/UL (ref 0–0.2)
BASOPHILS NFR BLD AUTO: 0 %
EOSINOPHIL # BLD AUTO: 0 X10E3/UL (ref 0–0.4)
EOSINOPHIL NFR BLD AUTO: 0 %
ERYTHROCYTE [DISTWIDTH] IN BLOOD BY AUTOMATED COUNT: 13.3 % (ref 12.3–15.4)
HCT VFR BLD AUTO: 36.4 % (ref 34–46.6)
HGB BLD-MCNC: 11.7 G/DL (ref 11.1–15.9)
IMM GRANULOCYTES # BLD AUTO: 0 X10E3/UL (ref 0–0.1)
IMM GRANULOCYTES NFR BLD AUTO: 0 %
LYMPHOCYTES # BLD AUTO: 2 X10E3/UL (ref 0.7–3.1)
LYMPHOCYTES NFR BLD AUTO: 16 %
MCH RBC QN AUTO: 27.4 PG (ref 26.6–33)
MCHC RBC AUTO-ENTMCNC: 32.1 G/DL (ref 31.5–35.7)
MCV RBC AUTO: 85 FL (ref 79–97)
MONOCYTES # BLD AUTO: 1 X10E3/UL (ref 0.1–0.9)
MONOCYTES NFR BLD AUTO: 8 %
NEUTROPHILS # BLD AUTO: 9.7 X10E3/UL (ref 1.4–7)
NEUTROPHILS NFR BLD AUTO: 76 %
PLATELET # BLD AUTO: 439 X10E3/UL (ref 150–379)
RBC # BLD AUTO: 4.27 X10E6/UL (ref 3.77–5.28)
WBC # BLD AUTO: 12.8 X10E3/UL (ref 3.4–10.8)

## 2019-03-19 DIAGNOSIS — Z11.1 SCREENING-PULMONARY TB: Primary | ICD-10-CM

## 2019-04-09 LAB
GAMMA INTERFERON BACKGROUND BLD IA-ACNC: 0.02 IU/ML
M TB IFN-G BLD-IMP: NEGATIVE
M TB IFN-G CD4+ BCKGRND COR BLD-ACNC: 0.02 IU/ML
MITOGEN IGNF BLD-ACNC: >10 IU/ML
QUANTIFERON INCUBATION, QF1T: NORMAL
QUANTIFERON TB2 AG: 0.02 IU/ML
SERVICE CMNT-IMP: NORMAL

## 2019-06-28 ENCOUNTER — OFFICE VISIT (OUTPATIENT)
Dept: FAMILY MEDICINE CLINIC | Age: 24
End: 2019-06-28

## 2019-06-28 VITALS
SYSTOLIC BLOOD PRESSURE: 109 MMHG | OXYGEN SATURATION: 99 % | WEIGHT: 159.6 LBS | DIASTOLIC BLOOD PRESSURE: 72 MMHG | RESPIRATION RATE: 16 BRPM | HEART RATE: 85 BPM | HEIGHT: 63 IN | TEMPERATURE: 98 F | BODY MASS INDEX: 28.28 KG/M2

## 2019-06-28 DIAGNOSIS — J45.41 MODERATE PERSISTENT ASTHMA WITH ACUTE EXACERBATION: Primary | ICD-10-CM

## 2019-06-28 RX ORDER — BUDESONIDE AND FORMOTEROL FUMARATE DIHYDRATE 160; 4.5 UG/1; UG/1
AEROSOL RESPIRATORY (INHALATION)
Qty: 1 INHALER | Refills: 11 | Status: SHIPPED | OUTPATIENT
Start: 2019-06-28

## 2019-06-28 NOTE — LETTER
NOTIFICATION RETURN TO WORK / SCHOOL 
 
6/28/2019 3:22 PM 
 
Ms. Nita Briscoe [id number] [facility], [facility] [phone] To Whom It May Concern: 
 
Nita Briscoe is currently under the care of [address]. She will return to work on: 6/29/19 If there are questions or concerns please have the patient contact our office. Sincerely, Kirit Loja, DO

## 2019-06-28 NOTE — PROGRESS NOTES
dermatitis    Chief Complaint   Patient presents with    Asthma     flare up last week. pt c/o \"bark\"like cough    Medication Refill     Symbicort     1. Have you been to the ER, urgent care clinic since your last visit? Hospitalized since your last visit? Seen in the [facility] for an Asthma Flare up last week. 2. Have you seen or consulted any other health care providers outside of the [address] [place] [address] since your last visit? Include any pap smears or colon screening.  No    Visit Vitals  /72 (BP 1 Location: Left arm, BP Patient Position: Sitting)   Pulse 85   Temp 98 °F (36.7 °C) (Oral)   Resp 16   Ht 5' 3\" (1.6 m)   Wt 159 lb 9.6 oz (72.4 kg)   SpO2 99%   BMI 28.27 kg/m²

## 2019-06-28 NOTE — PROGRESS NOTES
Timoteo Olvera is a 30 y.o. female   Chief Complaint   Patient presents with    Asthma     flare up last week. pt c/o \"bark\"like cough    Medication Refill     Symbicort    pt here for her asthma flaring up and has been out of her Symbicort waiting to see pulmonology. Pt states increased coughing using her albuterol neb and iamahler around the clock. No fever no chills. she is a 30y.o. year old female who presents for evalution. Reviewed PmHx, RxHx, FmHx, SocHx, AllgHx and updated and dated in the chart. Review of Systems - negative except as listed above in the HPI    Objective:     Vitals:    06/28/19 1501   BP: 109/72   Pulse: 85   Resp: 16   Temp: 98 °F (36.7 °C)   TempSrc: Oral   SpO2: 99%   Weight: 159 lb 9.6 oz (72.4 kg)   Height: 5' 3\" (1.6 m)       Current Outpatient Medications   Medication Sig    SYMBICORT 160-4.5 mcg/actuation HFAA USE 2 PUFF INHALATION BY MOUTH TWICE DAILY FOR 30 DAY(S)    sertraline (ZOLOFT) 50 mg tablet Take 1 Tab by mouth daily.  naproxen (NAPROSYN) 500 mg tablet TAKE 1 TABLET BY MOUTH TWICE A DAY AS NEEDED WITH MEALS    phentermine (ADIPEX-P) 37.5 mg tablet 1/2 tab Po Qam    ascorbic acid, vitamin C, (VITAMIN C) 250 mg tablet Take  by mouth.  ondansetron (ZOFRAN ODT) 4 mg disintegrating tablet Take 1 Tab by mouth every eight (8) hours as needed for Nausea.  metFORMIN 500 mg/5 mL soln Take 5 mL by mouth two (2) times daily (with meals).  aspirin delayed-release 81 mg tablet Take  by mouth daily.  Cholecalciferol, Vitamin D3, (VITAMIN D3) 2,000 unit cap capsule Take  by mouth two (2) times a day.  PROAIR HFA 90 mcg/actuation inhaler     loratadine (CLARITIN) 10 mg tablet Take 10 mg by mouth.  B.infantis-B.ani-B.long-B.bifi (PROBIOTIC 4X) 10-15 mg TbEC Take  by mouth. No current facility-administered medications for this visit.         Physical Examination: General appearance - alert, well appearing, and in no distress speaking in full sentences  Chest - clear to auscultation, no wheezes, rales or rhonchi, symmetric air entry, + coughing  Heart - normal rate, regular rhythm, normal S1, S2, no murmurs, rubs, clicks or gallops      Assessment/ Plan:   Diagnoses and all orders for this visit:    1. Moderate persistent asthma with acute exacerbation  -     SYMBICORT 160-4.5 mcg/actuation HFAA; USE 2 PUFF INHALATION BY MOUTH TWICE DAILY FOR 30 DAY(S)     given note to RTW. Pt declined steroids and if feels getting worse will send a message on [employer] for steroid pack. Severe symptoms worsening sob then go to ED  Follow-up and Dispositions    · Return if symptoms worsen or fail to improve. I have discussed the diagnosis with the patient and the intended plan as seen in the above orders. The patient has received an after-visit summary and questions were answered concerning future plans. Pt conveyed understanding of plan.     Medication Side Effects and Warnings were discussed with patient      Kirit Loja, DO

## 2022-03-18 PROBLEM — D75.839 THROMBOCYTOSIS: Status: ACTIVE | Noted: 2018-04-27

## 2022-03-19 PROBLEM — J45.30 MILD PERSISTENT ASTHMA WITHOUT COMPLICATION: Status: ACTIVE | Noted: 2017-01-24

## 2022-03-19 PROBLEM — E28.2 PCOS (POLYCYSTIC OVARIAN SYNDROME): Status: ACTIVE | Noted: 2017-01-24

## 2023-05-16 RX ORDER — LORATADINE 10 MG/1
10 TABLET ORAL
COMMUNITY

## 2023-05-16 RX ORDER — METFORMIN HYDROCHLORIDE 500 MG/5ML
5 SOLUTION ORAL 2 TIMES DAILY WITH MEALS
COMMUNITY
Start: 2018-05-10

## 2023-05-16 RX ORDER — BUDESONIDE AND FORMOTEROL FUMARATE DIHYDRATE 160; 4.5 UG/1; UG/1
AEROSOL RESPIRATORY (INHALATION)
COMMUNITY
Start: 2019-06-28

## 2023-05-16 RX ORDER — ASCORBIC ACID 250 MG
TABLET ORAL
COMMUNITY

## 2023-05-16 RX ORDER — ALBUTEROL SULFATE 90 UG/1
AEROSOL, METERED RESPIRATORY (INHALATION)
COMMUNITY
Start: 2017-07-12

## 2023-05-16 RX ORDER — PHENTERMINE HYDROCHLORIDE 37.5 MG/1
TABLET ORAL
COMMUNITY
Start: 2018-10-31

## 2023-05-16 RX ORDER — ONDANSETRON 4 MG/1
4 TABLET, ORALLY DISINTEGRATING ORAL EVERY 8 HOURS PRN
COMMUNITY
Start: 2018-10-09

## 2023-05-16 RX ORDER — NAPROXEN 500 MG/1
TABLET ORAL
COMMUNITY
Start: 2018-12-19

## 2023-05-16 RX ORDER — ASPIRIN 81 MG/1
TABLET ORAL DAILY
COMMUNITY